# Patient Record
Sex: FEMALE | Race: OTHER | HISPANIC OR LATINO
[De-identification: names, ages, dates, MRNs, and addresses within clinical notes are randomized per-mention and may not be internally consistent; named-entity substitution may affect disease eponyms.]

---

## 2017-09-20 ENCOUNTER — APPOINTMENT (OUTPATIENT)
Dept: PULMONOLOGY | Facility: CLINIC | Age: 47
End: 2017-09-20
Payer: COMMERCIAL

## 2017-09-20 VITALS
WEIGHT: 153 LBS | BODY MASS INDEX: 27.11 KG/M2 | OXYGEN SATURATION: 98 % | SYSTOLIC BLOOD PRESSURE: 100 MMHG | HEART RATE: 75 BPM | RESPIRATION RATE: 15 BRPM | HEIGHT: 63 IN | DIASTOLIC BLOOD PRESSURE: 70 MMHG

## 2017-09-20 PROCEDURE — 94010 BREATHING CAPACITY TEST: CPT

## 2017-09-20 PROCEDURE — 99214 OFFICE O/P EST MOD 30 MIN: CPT | Mod: 25

## 2017-09-22 ENCOUNTER — APPOINTMENT (OUTPATIENT)
Dept: PULMONOLOGY | Facility: CLINIC | Age: 47
End: 2017-09-22

## 2018-04-11 ENCOUNTER — APPOINTMENT (OUTPATIENT)
Dept: PULMONOLOGY | Facility: CLINIC | Age: 48
End: 2018-04-11
Payer: COMMERCIAL

## 2018-04-11 VITALS
SYSTOLIC BLOOD PRESSURE: 110 MMHG | HEART RATE: 68 BPM | OXYGEN SATURATION: 100 % | WEIGHT: 141 LBS | BODY MASS INDEX: 24.98 KG/M2 | HEIGHT: 63 IN | RESPIRATION RATE: 17 BRPM | DIASTOLIC BLOOD PRESSURE: 70 MMHG

## 2018-04-11 PROCEDURE — 95012 NITRIC OXIDE EXP GAS DETER: CPT

## 2018-04-11 PROCEDURE — 94010 BREATHING CAPACITY TEST: CPT

## 2018-04-11 PROCEDURE — 99214 OFFICE O/P EST MOD 30 MIN: CPT | Mod: 25

## 2019-05-22 ENCOUNTER — NON-APPOINTMENT (OUTPATIENT)
Age: 49
End: 2019-05-22

## 2019-05-22 ENCOUNTER — APPOINTMENT (OUTPATIENT)
Dept: PULMONOLOGY | Facility: CLINIC | Age: 49
End: 2019-05-22
Payer: COMMERCIAL

## 2019-05-22 VITALS
HEIGHT: 62 IN | DIASTOLIC BLOOD PRESSURE: 78 MMHG | HEART RATE: 75 BPM | SYSTOLIC BLOOD PRESSURE: 125 MMHG | WEIGHT: 149 LBS | OXYGEN SATURATION: 99 % | BODY MASS INDEX: 27.42 KG/M2 | RESPIRATION RATE: 17 BRPM

## 2019-05-22 DIAGNOSIS — Z86.39 PERSONAL HISTORY OF OTHER ENDOCRINE, NUTRITIONAL AND METABOLIC DISEASE: ICD-10-CM

## 2019-05-22 DIAGNOSIS — Z86.79 PERSONAL HISTORY OF OTHER DISEASES OF THE CIRCULATORY SYSTEM: ICD-10-CM

## 2019-05-22 PROCEDURE — 95012 NITRIC OXIDE EXP GAS DETER: CPT

## 2019-05-22 PROCEDURE — 99214 OFFICE O/P EST MOD 30 MIN: CPT | Mod: 25

## 2019-05-22 PROCEDURE — 94010 BREATHING CAPACITY TEST: CPT

## 2019-05-22 RX ORDER — HYDROCHLOROTHIAZIDE 12.5 MG/1
12.5 TABLET ORAL
Refills: 0 | Status: ACTIVE | COMMUNITY

## 2019-05-22 NOTE — ADDENDUM
[FreeTextEntry1] : Documented by Marlo Spencer acting as a scribe for Dr. Gus Tovar on 05/22/2019.\par \par All medical record entries made by the Scribe were at my, Dr. Gus Tovar's, direction and personally dictated by me on 05/22/2019. I have reviewed the chart and agree that the record accurately reflects my personal performance of the history, physical exam, assessment and plan. I have also personally directed, reviewed, and agree with the discharge instructions.

## 2019-05-22 NOTE — REASON FOR VISIT
[Follow-Up] : a follow-up visit [FreeTextEntry1] : allergies, asthma, chronic cough, GERD, and poor sleep

## 2019-05-22 NOTE — PROCEDURE
[FreeTextEntry1] : PFT revealed normal flows, with a FEV1 of 2.46L, which is 92% of predicted, with a normal flow volume loop \par \par FENO was 8; a normal value being less than 25\par Fractional exhaled nitric oxide (FENO) is regarded as a simple, noninvasive method for assessing eosinophilic airway inflammation. Produced by a variety of cells within the lung, nitric oxide (NO) concentrations are generally low in healthy individuals. However, high concentrations of NO appear to be involved in nonspecific host defense mechanisms and chronic inflammatory diseases such as asthma. The American Thoracic Society (ATS) therefore has recommended using FENO to aid in the diagnosis and monitoring of eosinophilic airway inflammation and asthma, and for identifying steroid responsive individuals whose chronic respiratory symptoms may be caused by airway inflammation.\par

## 2019-05-22 NOTE — ASSESSMENT
[FreeTextEntry1] : Ms. Sams is a 49 year old female with a history of asthma, allergy, GERD, new HTN and Cholesterol - noncompliant.\par \par Her chronic cough is stable and multifactorial due to:\par -moderate persistent asthma\par -post nasal drip/allergies\par -reflux \par \par problem 1: moderate persistent asthma, (flare due to noncompliance)\par -Add Alvesco (80) 1 inhalation BID \par -Add ProAir 2 inhalations Q6H\par -continue to use Singulair 10 mg before bed\par \par -Asthma is  believed to be caused by inherited (genetic) and environmental factor, but its exact cause is unknown. Asthma may be triggered by allergens, lung infections, or irritants in the air. Asthma triggers are different for each person\par -Inhaler technique reviewed as well as oral hygiene techniques reviewed with patient. Avoidance of cold air, extremes of temperature, rescue inhaler should be used before exercise. Order of medication reviewed with patient. Recommended use of a cool mist humidifier in the bedroom. \par \par problem 2: allergies/post nasal drip syndrome\par -continue to use nasal saline \par -continue to use Nasonex 1 sniff each nostril BID\par -continue to use Astelin nasal spray 1 sniff each nostril BID\par -continue Claritin 10mg QHS\par -Environmental measures for allergies were encouraged including mattress and pillow cover, air purifier, and environmental controls. \par \par problem 3: GERD \par -can continue to use OTC Pepcid 40mg QHS\par -recommended to chew on DGL before meals as well\par -Rule of 2s: avoid eating too much, eating too late, eating too spicy, eating two hours before bed\par -Things to avoid including overeating, spicy foods, tight clothing, eating within three hours of bed, this list is not all inclusive. \par -For treatment of reflux, possible options discussed including diet control, H2 blockers, PPIs, as well as coating motility agents discussed as treatment options. Timing of meals and proximity of last meal to sleep were discussed. If symptoms persist, a formal gastrointestinal evaluation is needed.  \par \par problem 4: questionable sleep study - no snoring\par -based on her fatigue, poor sleep, silent reflux, questionable snoring, and elevated mallampati class\par -she is being set up for an at home sleep study\par -Discussed the risks/associations with coronary artery disease, atrial fibrillation, arrhythmia, memory loss, issues with concentration, hypertension, nocturia, chronic reflux/Calvin’s esophagus some but not all inclusive. Treatment options discussed including CPAP/BiPAP machine, oral appliance, ProVent therapy, Oxy-Aid by [employer], new technologies, or positional sleep.\par \par problem 5: overweight, improved \par Weight loss, exercise, and diet control were discussed and are highly encouraged. Treatment options were given such as, aqua therapy, and contacting a nutritionist. Recommended to use the elliptical, stationary bike, less use of treadmill. Mindful eating was explained to the patient Obesity is associated with worsening asthma, shortness of breath, and potential for cardiac disease, diabetes, and other underlying medical conditions. \par \par problem 6: health maintenance \par -recommended yearly flu shot after October 15\par -recommended strep pneumonia vaccines: Prevnar-13 vaccine, followed by Pneumo vaccine 23 one year following\par -recommended early intervention for URIs\par -recommended regular osteoporosis evaluations\par -recommended early dermatological evaluations\par -recommended after the age of 50 to the age of 70, colonoscopy every 5 years \par \par F/U in 6 months\par She is encouraged to call with any changes, concerns, or questions

## 2019-05-22 NOTE — HISTORY OF PRESENT ILLNESS
[FreeTextEntry1] : Ms. Sams is a 48 year old female presenting to the office today for a follow up visit for allergies, asthma, chronic cough, GERD, and poor sleep. Her chief complaint is \par -she reports having newly diagnosed elevated cholesterol and HTN\par -she notes she exercises regularly by walking\par -she notes her eyes are dry with her contacts\par -she reports her reflux is controlled with medication, with rare episodes occurring\par -she notes her vision quality has slightly decreased, with possible beginnings of cataracts\par -she reports constantly coughing, with a weak voice\par -she states she is constantly tired\par -she denies any snoring, chest pain, chest pressure, diarrhea, constipation, dysphagia, dizziness, leg swelling, leg pain, itchy eyes, itchy ears, heartburn, sour taste in the mouth, myalgias or arthralgias.

## 2019-05-28 ENCOUNTER — MEDICATION RENEWAL (OUTPATIENT)
Age: 49
End: 2019-05-28

## 2019-05-28 RX ORDER — AZELASTINE HYDROCHLORIDE 137 UG/1
0.1 SPRAY, METERED NASAL TWICE DAILY
Qty: 3 | Refills: 1 | Status: ACTIVE | COMMUNITY
Start: 2017-09-20 | End: 1900-01-01

## 2020-06-07 ENCOUNTER — RX RENEWAL (OUTPATIENT)
Age: 50
End: 2020-06-07

## 2020-07-15 ENCOUNTER — APPOINTMENT (OUTPATIENT)
Dept: PULMONOLOGY | Facility: CLINIC | Age: 50
End: 2020-07-15
Payer: COMMERCIAL

## 2020-07-15 VITALS
TEMPERATURE: 97.6 F | DIASTOLIC BLOOD PRESSURE: 78 MMHG | HEART RATE: 76 BPM | HEIGHT: 62 IN | OXYGEN SATURATION: 98 % | WEIGHT: 154 LBS | RESPIRATION RATE: 17 BRPM | SYSTOLIC BLOOD PRESSURE: 130 MMHG | BODY MASS INDEX: 28.34 KG/M2

## 2020-07-15 DIAGNOSIS — Z01.812 ENCOUNTER FOR PREPROCEDURAL LABORATORY EXAMINATION: ICD-10-CM

## 2020-07-15 PROCEDURE — 71046 X-RAY EXAM CHEST 2 VIEWS: CPT

## 2020-07-15 PROCEDURE — 99214 OFFICE O/P EST MOD 30 MIN: CPT | Mod: 25

## 2020-07-15 NOTE — ASSESSMENT
[FreeTextEntry1] : Ms. Sams is a 50 year old female with a history of asthma, allergy, GERD, Eosinophilia, Esophagitis, new HTN and Cholesterol - noncompliant - intermittent cough. \par \par Her chronic cough is stable and multifactorial due to:\par -moderate persistent asthma\par -post nasal drip/allergies\par -reflux \par \par problem 1: moderate persistent asthma, (flare due to noncompliance)\par -Add Symbicort (160) 2 inhalations BID PRN \par -continue ProAir 2 inhalations Q6H\par -continue to use Singulair 10 mg before bed\par \par -Asthma is  believed to be caused by inherited (genetic) and environmental factor, but its exact cause is unknown. Asthma may be triggered by allergens, lung infections, or irritants in the air. Asthma triggers are different for each person\par -Inhaler technique reviewed as well as oral hygiene techniques reviewed with patient. Avoidance of cold air, extremes of temperature, rescue inhaler should be used before exercise. Order of medication reviewed with patient. Recommended use of a cool mist humidifier in the bedroom. \par \par problem 2: allergies/post nasal drip syndrome\par -continue to use nasal saline \par -continue to use Nasonex 1 sniff each nostril BID\par -continue to use Astelin nasal spray 1 sniff each nostril BID\par -continue Claritin 10mg QHS\par -Environmental measures for allergies were encouraged including mattress and pillow cover, air purifier, and environmental controls. \par \par problem 3: GERD - noncompliance \par -continue to use OTC Pepcid 40mg QHS (regular use) \par -recommended to chew on DGL before meals as well\par -Rule of 2s: avoid eating too much, eating too late, eating too spicy, eating two hours before bed\par -Things to avoid including overeating, spicy foods, tight clothing, eating within three hours of bed, this list is not all inclusive. \par -For treatment of reflux, possible options discussed including diet control, H2 blockers, PPIs, as well as coating motility agents discussed as treatment options. Timing of meals and proximity of last meal to sleep were discussed. If symptoms persist, a formal gastrointestinal evaluation is needed.  \par \par problem 4: questionable sleep study - no snoring\par -based on her fatigue, poor sleep, silent reflux, questionable snoring, and elevated mallampati class\par -she is being set up for an at home sleep study\par -Discussed the risks/associations with coronary artery disease, atrial fibrillation, arrhythmia, memory loss, issues with concentration, hypertension, nocturia, chronic reflux/Calvin’s esophagus some but not all inclusive. Treatment options discussed including CPAP/BiPAP machine, oral appliance, ProVent therapy, Oxy-Aid by [employer], new technologies, or positional sleep.\par \par problem 5: overweight, improved \par Weight loss, exercise, and diet control were discussed and are highly encouraged. Treatment options were given such as, aqua therapy, and contacting a nutritionist. Recommended to use the elliptical, stationary bike, less use of treadmill. Mindful eating was explained to the patient Obesity is associated with worsening asthma, shortness of breath, and potential for cardiac disease, diabetes, and other underlying medical conditions. \par \par problem 6: health maintenance \par -recommended yearly flu shot after October 15\par -recommended strep pneumonia vaccines: Prevnar-13 vaccine, followed by Pneumo vaccine 23 one year following\par -recommended early intervention for URIs\par -recommended regular osteoporosis evaluations\par -recommended early dermatological evaluations\par -recommended after the age of 50 to the age of 70, colonoscopy every 5 years \par \par F/U in 6 months\par She is encouraged to call with any changes, concerns, or questions

## 2020-07-15 NOTE — PHYSICAL EXAM
[General Appearance - Well Developed] : well developed [General Appearance - Well Nourished] : well nourished [Well Groomed] : well groomed [Normal Appearance] : normal appearance [General Appearance - In No Acute Distress] : no acute distress [No Deformities] : no deformities [Eyelids - No Xanthelasma] : the eyelids demonstrated no xanthelasmas [Normal Conjunctiva] : the conjunctiva exhibited no abnormalities [Normal Oropharynx] : normal oropharynx [II] : II [Jugular Venous Distention Increased] : there was no jugular-venous distention [Neck Appearance] : the appearance of the neck was normal [Neck Cervical Mass (___cm)] : no neck mass was observed [Thyroid Diffuse Enlargement] : the thyroid was not enlarged [Thyroid Nodule] : there were no palpable thyroid nodules [Heart Rate And Rhythm] : heart rate and rhythm were normal [Heart Sounds] : normal S1 and S2 [Murmurs] : no murmurs present [Respiration, Rhythm And Depth] : normal respiratory rhythm and effort [Exaggerated Use Of Accessory Muscles For Inspiration] : no accessory muscle use [Auscultation Breath Sounds / Voice Sounds] : lungs were clear to auscultation bilaterally [Abdomen Tenderness] : non-tender [Abdomen Soft] : soft [Abdomen Mass (___ Cm)] : no abdominal mass palpated [Gait - Sufficient For Exercise Testing] : the gait was sufficient for exercise testing [Abnormal Walk] : normal gait [Nail Clubbing] : no clubbing of the fingernails [Cyanosis, Localized] : no localized cyanosis [Petechial Hemorrhages (___cm)] : no petechial hemorrhages [Skin Color & Pigmentation] : normal skin color and pigmentation [] : no rash [No Venous Stasis] : no venous stasis [Skin Lesions] : no skin lesions [No Skin Ulcers] : no skin ulcer [No Xanthoma] : no  xanthoma was observed [No Focal Deficits] : no focal deficits [Deep Tendon Reflexes (DTR)] : deep tendon reflexes were 2+ and symmetric [Sensation] : the sensory exam was normal to light touch and pinprick [Oriented To Time, Place, And Person] : oriented to person, place, and time [Affect] : the affect was normal [Impaired Insight] : insight and judgment were intact [FreeTextEntry1] : I:E 1:3; clear

## 2020-07-15 NOTE — ADDENDUM
[FreeTextEntry1] : Documented by Paramjit Wolff acting as a scribe for Dr. Gus Tovar on 07/15/2020 \par \par All medical record entries made by the Scribe were at my, Dr. Gus Tovar's, direction and personally dictated by me on 07/15/2020 . I have reviewed the chart and agree that the record accurately reflects my personal performance of the history, physical exam, assessment and plan. I have also personally directed, reviewed, and agree with the discharge instructions.

## 2020-07-15 NOTE — HISTORY OF PRESENT ILLNESS
[FreeTextEntry1] : Ms. Sams is a 48 year old female presenting to the office today for a follow up visit for allergies, asthma, chronic cough, GERD, and poor sleep. Her chief complaint is SOB. \par -she states that she went through 3000 dollars worth of allergies test and she was only allergic to one thing. \par -she states that her nose is always running. \par -she states that she feels fine but then she suddenly feels tightness. \par -reports dysphagia. \par -if she takes Pepcid at night her GERD and dysphagia better. \par -she states that she might be having panic attacks due to anxiety related with COVID-19. \par \par -She denies any headaches, nausea, vomiting, fever, chills, sweats, chest pain, chest pressure, diarrhea, constipation, dizziness, sour taste in the mouth, leg swelling, leg pain, itchy eyes, itchy ears, heartburn, reflux, myalgias or arthralgias.

## 2020-07-16 ENCOUNTER — TRANSCRIPTION ENCOUNTER (OUTPATIENT)
Age: 50
End: 2020-07-16

## 2020-07-16 RX ORDER — ROSUVASTATIN CALCIUM 5 MG/1
5 TABLET, FILM COATED ORAL
Refills: 0 | Status: DISCONTINUED | COMMUNITY
End: 2020-07-16

## 2020-07-16 RX ORDER — ATORVASTATIN CALCIUM 80 MG/1
TABLET, FILM COATED ORAL
Refills: 0 | Status: ACTIVE | COMMUNITY

## 2020-07-21 DIAGNOSIS — Z01.818 ENCOUNTER FOR OTHER PREPROCEDURAL EXAMINATION: ICD-10-CM

## 2020-07-24 ENCOUNTER — APPOINTMENT (OUTPATIENT)
Dept: DISASTER EMERGENCY | Facility: CLINIC | Age: 50
End: 2020-07-24

## 2020-07-24 LAB — SARS-COV-2 N GENE NPH QL NAA+PROBE: NOT DETECTED

## 2020-07-29 ENCOUNTER — APPOINTMENT (OUTPATIENT)
Dept: PULMONOLOGY | Facility: CLINIC | Age: 50
End: 2020-07-29
Payer: COMMERCIAL

## 2020-07-29 PROCEDURE — 94727 GAS DIL/WSHOT DETER LNG VOL: CPT

## 2020-07-29 PROCEDURE — 94729 DIFFUSING CAPACITY: CPT

## 2020-07-29 PROCEDURE — 94010 BREATHING CAPACITY TEST: CPT

## 2021-07-20 ENCOUNTER — APPOINTMENT (OUTPATIENT)
Dept: PULMONOLOGY | Facility: CLINIC | Age: 51
End: 2021-07-20
Payer: COMMERCIAL

## 2021-07-20 ENCOUNTER — NON-APPOINTMENT (OUTPATIENT)
Age: 51
End: 2021-07-20

## 2021-07-20 VITALS
SYSTOLIC BLOOD PRESSURE: 115 MMHG | BODY MASS INDEX: 27.79 KG/M2 | WEIGHT: 151 LBS | OXYGEN SATURATION: 99 % | DIASTOLIC BLOOD PRESSURE: 65 MMHG | HEART RATE: 78 BPM | RESPIRATION RATE: 16 BRPM | TEMPERATURE: 97.4 F | HEIGHT: 62 IN

## 2021-07-20 PROCEDURE — 95012 NITRIC OXIDE EXP GAS DETER: CPT

## 2021-07-20 PROCEDURE — 99214 OFFICE O/P EST MOD 30 MIN: CPT | Mod: 25

## 2021-07-20 PROCEDURE — 99072 ADDL SUPL MATRL&STAF TM PHE: CPT

## 2021-07-20 PROCEDURE — 94010 BREATHING CAPACITY TEST: CPT

## 2021-07-20 RX ORDER — CICLESONIDE 80 UG/1
80 AEROSOL, METERED RESPIRATORY (INHALATION) TWICE DAILY
Qty: 3 | Refills: 1 | Status: DISCONTINUED | COMMUNITY
Start: 2019-05-22 | End: 2021-07-20

## 2021-07-20 NOTE — PROCEDURE
[FreeTextEntry1] : \par PFT revealed normal flows, with a FEV1 of 2.43 L, which is 92% of predicted, normal lung volumes, and with a normal flow volume loop. \par \par FENO was 13; a normal value being less than 25\par Fractional exhaled nitric oxide (FENO) is regarded as a simple, noninvasive method for assessing eosinophilic airway inflammation. Produced by a variety of cells within the lung, nitric oxide (NO) concentrations are generally low in healthy individuals. However, high concentrations of NO appear to be involved in nonspecific host defense mechanisms and chronic inflammatory diseases such as asthma. The American Thoracic Society (ATS) therefore has recommended using FENO to aid in the diagnosis and monitoring of eosinophilic airway inflammation and asthma, and for identifying steroid responsive individuals whose chronic respiratory symptoms may be caused by airway inflammation.

## 2021-07-20 NOTE — HISTORY OF PRESENT ILLNESS
[FreeTextEntry1] : Ms. Sams is a 51 year old female presenting to the office today for a follow up visit for allergies, asthma, chronic cough, GERD, and poor sleep. Her chief complaint is intermittent cough\par \par -she notes generally feeling well\par -she notes compliant with allergy Rx\par -she notes supplementing with Arm and Hammer Saline\par -she notes mild intermittent cough with residual mild wheeze\par -she notes cough exacerbated while supine, worse in fall season and late spring\par -she notes heartburn and reflux controlled with Pepcid compliance\par -she denies palpitations\par -she notes dysphagia controlled\par -she notes sleep quality stable\par -she notes sleeping 8 hours a night on average\par -she denies snore\par -she notes regular exercise at least 3 days a week on average\par -she notes weight stable\par -she notes fitness levels stable\par -she notes intermittent dysphonia exacerbated by reflux \par -she notes intermittently drippy sinuses controlled with nasal saline\par -she notes intermittent irregular menstrual cycle\par \par -denies any chest pain, chest pressure, diarrhea, constipation, sour taste in the mouth, dizziness, leg swelling, leg pain, myalgias, arthralgias, itchy eyes, itchy ears.

## 2021-07-20 NOTE — PHYSICAL EXAM
[General Appearance - Well Developed] : well developed [Normal Appearance] : normal appearance [Well Groomed] : well groomed [General Appearance - Well Nourished] : well nourished [No Deformities] : no deformities [General Appearance - In No Acute Distress] : no acute distress [Normal Conjunctiva] : the conjunctiva exhibited no abnormalities [Eyelids - No Xanthelasma] : the eyelids demonstrated no xanthelasmas [Normal Oropharynx] : normal oropharynx [II] : II [Neck Appearance] : the appearance of the neck was normal [Neck Cervical Mass (___cm)] : no neck mass was observed [Jugular Venous Distention Increased] : there was no jugular-venous distention [Thyroid Nodule] : there were no palpable thyroid nodules [Thyroid Diffuse Enlargement] : the thyroid was not enlarged [Heart Rate And Rhythm] : heart rate and rhythm were normal [Heart Sounds] : normal S1 and S2 [Murmurs] : no murmurs present [Respiration, Rhythm And Depth] : normal respiratory rhythm and effort [Exaggerated Use Of Accessory Muscles For Inspiration] : no accessory muscle use [Auscultation Breath Sounds / Voice Sounds] : lungs were clear to auscultation bilaterally [Abdomen Soft] : soft [Abdomen Tenderness] : non-tender [Abdomen Mass (___ Cm)] : no abdominal mass palpated [Abnormal Walk] : normal gait [Gait - Sufficient For Exercise Testing] : the gait was sufficient for exercise testing [Nail Clubbing] : no clubbing of the fingernails [Cyanosis, Localized] : no localized cyanosis [Petechial Hemorrhages (___cm)] : no petechial hemorrhages [Skin Color & Pigmentation] : normal skin color and pigmentation [] : no rash [No Venous Stasis] : no venous stasis [Skin Lesions] : no skin lesions [No Skin Ulcers] : no skin ulcer [No Xanthoma] : no  xanthoma was observed [Deep Tendon Reflexes (DTR)] : deep tendon reflexes were 2+ and symmetric [Sensation] : the sensory exam was normal to light touch and pinprick [No Focal Deficits] : no focal deficits [Oriented To Time, Place, And Person] : oriented to person, place, and time [Impaired Insight] : insight and judgment were intact [Affect] : the affect was normal [FreeTextEntry1] : I:E 1:3; clear

## 2021-07-20 NOTE — ASSESSMENT
[FreeTextEntry1] : Ms. Sams is a 51 year old female with a history of asthma, allergy, GERD, Eosinophilia, Esophagitis, new HTN and Cholesterol - noncompliant - intermittent cough; PND\par \par Her chronic cough is stable and multifactorial due to:\par -moderate persistent asthma\par -post nasal drip/allergies\par -reflux \par \par problem 1: moderate persistent asthma, (semicompliance)\par -continue Symbicort (160) 2 inhalations BID PRN \par -continue ProAir 2 inhalations Q6H\par -continue to use Singulair 10 mg before bed\par \par -Asthma is  believed to be caused by inherited (genetic) and environmental factor, but its exact cause is unknown. Asthma may be triggered by allergens, lung infections, or irritants in the air. Asthma triggers are different for each person\par -Inhaler technique reviewed as well as oral hygiene techniques reviewed with patient. Avoidance of cold air, extremes of temperature, rescue inhaler should be used before exercise. Order of medication reviewed with patient. Recommended use of a cool mist humidifier in the bedroom. \par \par problem 2: allergies/post nasal drip syndrome\par -continue to use nasal saline \par -continue to use Nasonex 1 sniff each nostril BID\par -continue to use Astelin (0.15) nasal spray 1 sniff each nostril BID\par -continue Claritin 10mg QHS\par -Environmental measures for allergies were encouraged including mattress and pillow cover, air purifier, and environmental controls. \par \par problem 3: GERD - noncompliance \par -continue to use OTC Pepcid 40mg QHS (regular use) \par -recommended to chew on DGL before meals as well\par -Rule of 2s: avoid eating too much, eating too late, eating too spicy, eating two hours before bed\par -Things to avoid including overeating, spicy foods, tight clothing, eating within three hours of bed, this list is not all inclusive. \par -For treatment of reflux, possible options discussed including diet control, H2 blockers, PPIs, as well as coating motility agents discussed as treatment options. Timing of meals and proximity of last meal to sleep were discussed. If symptoms persist, a formal gastrointestinal evaluation is needed.  \par \par problem 4: questionable sleep study - no snoring\par -based on her fatigue, poor sleep, silent reflux, questionable snoring, and elevated Mallampati class\par -she is being set up for an at home sleep study\par -Discussed the risks/associations with coronary artery disease, atrial fibrillation, arrhythmia, memory loss, issues with concentration, hypertension, nocturia, chronic reflux/Calvin’s esophagus some but not all inclusive. Treatment options discussed including CPAP/BiPAP machine, oral appliance, ProVent therapy, Oxy-Aid by [employer], new technologies, or positional sleep.\par \par problem 5: overweight, improved \par Weight loss, exercise, and diet control were discussed and are highly encouraged. Treatment options were given such as, aqua therapy, and contacting a nutritionist. Recommended to use the elliptical, stationary bike, less use of treadmill. Mindful eating was explained to the patient Obesity is associated with worsening asthma, shortness of breath, and potential for cardiac disease, diabetes, and other underlying medical conditions. \par \par problem 6: health maintenance \par -s/p Moderna COVID 19 vaccine x 2\par -recommended yearly flu shot after October 15\par -recommended strep pneumonia vaccines: Prevnar-13 vaccine, followed by Pneumo vaccine 23 one year following\par -recommended early intervention for URIs\par -recommended regular osteoporosis evaluations\par -recommended early dermatological evaluations\par -recommended after the age of 50 to the age of 70, colonoscopy every 5 years \par \par F/U in 6 months\par She is encouraged to call with any changes, concerns, or questions

## 2021-07-20 NOTE — ADDENDUM
[FreeTextEntry1] : Documented by Guille Lopez acting as a scribe for Dr. Gus Tovar on 07/20/2021.\par \par All medical record entries made by the Scribe were at my, Dr. Gus Tovar's, direction and personally dictated by me on 07/20/2021 . I have reviewed the chart and agree that the record accurately reflects my personal performance of the history, physical exam, assessment and plan. I have also personally directed, reviewed, and agree with the discharge instructions. \par

## 2021-08-02 ENCOUNTER — TRANSCRIPTION ENCOUNTER (OUTPATIENT)
Age: 51
End: 2021-08-02

## 2021-08-03 ENCOUNTER — TRANSCRIPTION ENCOUNTER (OUTPATIENT)
Age: 51
End: 2021-08-03

## 2021-08-23 ENCOUNTER — TRANSCRIPTION ENCOUNTER (OUTPATIENT)
Age: 51
End: 2021-08-23

## 2022-01-11 ENCOUNTER — MED ADMIN CHARGE (OUTPATIENT)
Age: 52
End: 2022-01-11

## 2022-01-12 ENCOUNTER — APPOINTMENT (OUTPATIENT)
Dept: PULMONOLOGY | Facility: CLINIC | Age: 52
End: 2022-01-12
Payer: COMMERCIAL

## 2022-01-12 ENCOUNTER — NON-APPOINTMENT (OUTPATIENT)
Age: 52
End: 2022-01-12

## 2022-01-12 VITALS
HEIGHT: 62 IN | OXYGEN SATURATION: 98 % | BODY MASS INDEX: 28.16 KG/M2 | DIASTOLIC BLOOD PRESSURE: 72 MMHG | HEART RATE: 74 BPM | TEMPERATURE: 97.3 F | RESPIRATION RATE: 16 BRPM | WEIGHT: 153 LBS | SYSTOLIC BLOOD PRESSURE: 120 MMHG

## 2022-01-12 PROCEDURE — 94010 BREATHING CAPACITY TEST: CPT

## 2022-01-12 PROCEDURE — 99214 OFFICE O/P EST MOD 30 MIN: CPT | Mod: 25

## 2022-01-12 PROCEDURE — 95012 NITRIC OXIDE EXP GAS DETER: CPT

## 2022-01-12 RX ORDER — BUDESONIDE AND FORMOTEROL FUMARATE DIHYDRATE 160; 4.5 UG/1; UG/1
160-4.5 AEROSOL RESPIRATORY (INHALATION) TWICE DAILY
Qty: 3 | Refills: 1 | Status: ACTIVE | COMMUNITY

## 2022-01-12 RX ORDER — MONTELUKAST 10 MG/1
10 TABLET, FILM COATED ORAL
Qty: 90 | Refills: 1 | Status: ACTIVE | COMMUNITY
Start: 2019-05-22 | End: 1900-01-01

## 2022-01-12 RX ORDER — ALBUTEROL SULFATE 90 UG/1
108 (90 BASE) INHALANT RESPIRATORY (INHALATION)
Qty: 1 | Refills: 5 | Status: ACTIVE | COMMUNITY
Start: 2019-05-28 | End: 1900-01-01

## 2022-01-12 NOTE — HISTORY OF PRESENT ILLNESS
[FreeTextEntry1] : Ms. Sams is a 51 year old female presenting to the office today for a follow up visit for allergies, asthma, chronic cough, GERD, and poor sleep. Her chief complaint is \par -she is doing well in general \par -she notes heartburn and reflux controlled \par -she is walking 3x per week for exercise \par -she is sleeping 8-9 hours per night \par -she is using nasal saline regularly \par -She is using Symbicort at night \par \par - patient denies any headaches, nausea, vomiting, fever, chills, sweats, chest pain, chest pressure, palpitations, coughing, wheezing, fatigue, diarrhea, constipation, dysphagia, myalgias, dizziness, leg swelling, leg pain, itchy eyes, itchy ears, or sour taste in the mouth

## 2022-01-12 NOTE — PHYSICAL EXAM
[No Acute Distress] : no acute distress [Normal Oropharynx] : normal oropharynx [Normal Appearance] : normal appearance [No Neck Mass] : no neck mass [Normal Rate/Rhythm] : normal rate/rhythm [Normal S1, S2] : normal s1, s2 [No Murmurs] : no murmurs [No Resp Distress] : no resp distress [Clear to Auscultation Bilaterally] : clear to auscultation bilaterally [No Abnormalities] : no abnormalities [Benign] : benign [Normal Gait] : normal gait [No Clubbing] : no clubbing [No Cyanosis] : no cyanosis [No Edema] : no edema [FROM] : FROM [Normal Color/ Pigmentation] : normal color/ pigmentation [No Focal Deficits] : no focal deficits [Oriented x3] : oriented x3 [Normal Affect] : normal affect [III] : Mallampati Class: III [TextBox_2] : OW  [TextBox_68] : I:E 1:3; Clear

## 2022-01-12 NOTE — PROCEDURE
[FreeTextEntry1] : Feno was 10; a normal value being less than 25. Fractional exhaled nitric oxide (FENO) is regarded as a simple, noninvasive method for assessing eosinophilic airway inflammation. Produced by a variety of cells within the lung, nitric oxide (NO) concentrations are generally low in healthy individuals. However, high concentrations of NO appear to be involved in nonspecific host defense mechanisms and chronic inflammatory  diseases such as asthma. The American Thoracic Society (ATS) therefore recommended using FENO to aid in the diagnosis and monitoring of eosinophilic airway inflammation and asthma, and for identifying steroid responsive individuals whose chronic respiratory symptoms may be caused by airway inflammation \par \par PFT revealed normal flows, with a FEV1 of 2.48L, which is 94% of predicted, with a normal flow volume loop\par  \par \par -Images and procedures reviewed in detail and discussed with patient.

## 2022-01-12 NOTE — ADDENDUM
[FreeTextEntry1] : Documented by Wayne Dutta acting as a scribe for Dr. Gus Tovar on (01/12/2022).\par \par All medical record entries made by the Scribe were at my, Dr. Gus Tovar's, direction and personally dictated by me on (01/12/2022). I have reviewed the chart and agree that the record accurately reflects my personal performance of the history, physical exam, assessment and plan. I have also personally directed, reviewed, and agree with the discharge instructions.\par

## 2022-01-12 NOTE — ASSESSMENT
[FreeTextEntry1] : Ms. Sams is a 51 year old female with a history of asthma, allergy, GERD, OW, Eosinophilic Esophagitis, HTN and Cholesterol - noncompliant - intermittent asthma sx\par \par Her chronic cough is stable and multifactorial due to:\par -moderate persistent asthma\par -post nasal drip/allergies\par -reflux \par \par problem 1: moderate persistent asthma, (semicompliance)\par -continue Symbicort (160) 2 inhalations BID PRN \par -continue ProAir 2 inhalations Q6H\par -continue to use Singulair 10 mg before bed\par \par -Asthma is  believed to be caused by inherited (genetic) and environmental factor, but its exact cause is unknown. Asthma may be triggered by allergens, lung infections, or irritants in the air. Asthma triggers are different for each person\par -Inhaler technique reviewed as well as oral hygiene techniques reviewed with patient. Avoidance of cold air, extremes of temperature, rescue inhaler should be used before exercise. Order of medication reviewed with patient. Recommended use of a cool mist humidifier in the bedroom. \par \par problem 2: allergies/post nasal drip syndrome\par -continue to use nasal saline washer \par -continue to use Nasonex 1 sniff each nostril BID\par -continue to use Astelin (0.15) nasal spray 1 sniff each nostril BID\par -continue Claritin 10mg QHS\par -Environmental measures for allergies were encouraged including mattress and pillow cover, air purifier, and environmental controls. \par \par problem 3: GERD - noncompliance \par -continue to use OTC Pepcid 40mg QHS (regular use) \par -recommended to chew on DGL before meals as well\par -Rule of 2s: avoid eating too much, eating too late, eating too spicy, eating two hours before bed\par -Things to avoid including overeating, spicy foods, tight clothing, eating within three hours of bed, this list is not all inclusive. \par -For treatment of reflux, possible options discussed including diet control, H2 blockers, PPIs, as well as coating motility agents discussed as treatment options. Timing of meals and proximity of last meal to sleep were discussed. If symptoms persist, a formal gastrointestinal evaluation is needed.  \par \par problem 4: questionable sleep study - no snoring\par -based on her fatigue, poor sleep, silent reflux, questionable snoring, and elevated Mallampati class\par -she is being set up for an at home sleep study\par -Discussed the risks/associations with coronary artery disease, atrial fibrillation, arrhythmia, memory loss, issues with concentration, hypertension, nocturia, chronic reflux/Calvin’s esophagus some but not all inclusive. Treatment options discussed including CPAP/BiPAP machine, oral appliance, ProVent therapy, Oxy-Aid by [employer], new technologies, or positional sleep.\par \par problem 5: overweight, improved \par -Recommend "Muniq" OTC Supplement for weight loss, energy levels, and blood sugar levels \par Weight loss, exercise, and diet control were discussed and are highly encouraged. Treatment options were given such as, aqua therapy, and contacting a nutritionist. Recommended to use the elliptical, stationary bike, less use of treadmill. Mindful eating was explained to the patient Obesity is associated with worsening asthma, shortness of breath, and potential for cardiac disease, diabetes, and other underlying medical conditions. \par \par problem 6: health maintenance \par -s/p Moderna COVID 19 vaccine x 2 (booster on hold) \par -recommended yearly flu shot after October 15 2021 \par -recommended strep pneumonia vaccines: Prevnar-13 vaccine, followed by Pneumo vaccine 23 one year following\par -recommended early intervention for URIs\par -recommended regular osteoporosis evaluations\par -recommended early dermatological evaluations\par -recommended after the age of 50 to the age of 70, colonoscopy every 5 years \par \par F/U in 6 months\par She is encouraged to call with any changes, concerns, or questions

## 2022-01-13 ENCOUNTER — TRANSCRIPTION ENCOUNTER (OUTPATIENT)
Age: 52
End: 2022-01-13

## 2022-01-17 ENCOUNTER — RX RENEWAL (OUTPATIENT)
Age: 52
End: 2022-01-17

## 2022-01-17 ENCOUNTER — TRANSCRIPTION ENCOUNTER (OUTPATIENT)
Age: 52
End: 2022-01-17

## 2022-03-29 ENCOUNTER — TRANSCRIPTION ENCOUNTER (OUTPATIENT)
Age: 52
End: 2022-03-29

## 2022-03-29 ENCOUNTER — NON-APPOINTMENT (OUTPATIENT)
Age: 52
End: 2022-03-29

## 2022-04-14 ENCOUNTER — APPOINTMENT (OUTPATIENT)
Dept: PULMONOLOGY | Facility: CLINIC | Age: 52
End: 2022-04-14
Payer: COMMERCIAL

## 2022-04-14 VITALS
TEMPERATURE: 97.2 F | HEART RATE: 74 BPM | OXYGEN SATURATION: 98 % | DIASTOLIC BLOOD PRESSURE: 84 MMHG | BODY MASS INDEX: 27.97 KG/M2 | RESPIRATION RATE: 16 BRPM | SYSTOLIC BLOOD PRESSURE: 136 MMHG | HEIGHT: 62 IN | WEIGHT: 152 LBS

## 2022-04-14 PROCEDURE — 94010 BREATHING CAPACITY TEST: CPT

## 2022-04-14 PROCEDURE — 95012 NITRIC OXIDE EXP GAS DETER: CPT

## 2022-04-14 PROCEDURE — 99214 OFFICE O/P EST MOD 30 MIN: CPT | Mod: 25

## 2022-04-14 PROCEDURE — 94729 DIFFUSING CAPACITY: CPT

## 2022-04-14 PROCEDURE — 94727 GAS DIL/WSHOT DETER LNG VOL: CPT

## 2022-04-14 RX ORDER — OLOPATADINE HYDROCHLORIDE 2 MG/ML
0.2 SOLUTION OPHTHALMIC
Qty: 1 | Refills: 3 | Status: ACTIVE | COMMUNITY
Start: 2022-04-14 | End: 1900-01-01

## 2022-04-14 NOTE — PROCEDURE
[FreeTextEntry1] : Full PFT revealed normal flows, with a FEV1 of 2.53L,which is 100% of predicted but mild obstructive dysfunction at mid to low lung volumes,  normal lung volumes, and a diffusion of 21.8 , which is 102% of predicted with a normal flow volume loop\par \par FENO was 9 ; a normal value being less than 25\par Fractional exhaled nitric oxide (FENO) is regarded as a simple, noninvasive method for assessing eosinophilic airway inflammation. Produced by a variety of cells within the lung, nitric oxide (NO) concentrations are generally low in healthy individuals. However, high concentrations of NO appear to be involved in nonspecific host defense mechanisms and chronic inflammatory diseases such as asthma. The American Thoracic Society (ATS) therefore has recommended using FENO to aid in the diagnosis and monitoring of eosinophilic airway inflammation and asthma, and for identifying steroid responsive individuals whose chronic respiratory symptoms may be caused by airway inflammation. \par \par Blood work (2/2022) AARON positive, anti osorio rnp positive, low normal Vitamin D of 32, wbc 4.5 hgb 15.3 hct 43.7 plt 249 eos 140, CRP 1  \par

## 2022-04-14 NOTE — ADDENDUM
[FreeTextEntry1] : Documented by Hector Hollins acting as a scribe for Dr. Gus Tovar on 04/14/2022 \par \par All medical record entries made by the Scribe were at my, Dr. Gus Tovar's, direction and personally dictated by me on 04/14/2022 . I have reviewed the chart and agree that the record accurately reflects my personal performance of the history, physical exam, assessment and plan. I have also personally directed, reviewed, and agree with the discharge instructions

## 2022-04-14 NOTE — ASSESSMENT
[FreeTextEntry1] : Ms. Sams is a 52 year old female with a history of asthma, allergy, GERD, OW, Eosinophilic Esophagitis, HTN and Cholesterol - noncompliant - intermittent asthma sx, #1 issue scicca syndrome \par \par Her chronic cough is stable and multifactorial due to:\par -moderate persistent asthma\par -post nasal drip/allergies\par -reflux \par \par Problem 1A sicca Sx\par -recommended rheumatologist evaluation \par -recommended ENT evaluation ([facility])\par -recommended mouth kote tea \par -recommended oral pathologist ([facility]/ Hern[facility]) \par \par problem 1: moderate persistent asthma, (semi compliance)\par -continue Symbicort (160) 2 inhalations BID PRN \par -continue ProAir 2 inhalations Q6H\par -continue to use Singulair 10 mg before bed\par \par -Asthma is  believed to be caused by inherited (genetic) and environmental factor, but its exact cause is unknown. Asthma may be triggered by allergens, lung infections, or irritants in the air. Asthma triggers are different for each person\par -Inhaler technique reviewed as well as oral hygiene techniques reviewed with patient. Avoidance of cold air, extremes of temperature, rescue inhaler should be used before exercise. Order of medication reviewed with patient. Recommended use of a cool mist humidifier in the bedroom. \par \par problem 2: allergies/post nasal drip syndrome\par  -Add Olopatadine 0.2% eye drops, one drop in each eye BID \par -continue to use nasal saline washer \par -continue to use Nasonex 1 sniff each nostril BID\par -continue to use Astelin (0.15) nasal spray 1 sniff each nostril BID\par -continue Claritin 10mg QHS\par -Environmental measures for allergies were encouraged including mattress and pillow cover, air purifier, and environmental controls. \par \par problem 3: GERD - noncompliance \par -continue to use OTC Pepcid 40mg QHS (regular use) \par -recommended to chew on DGL before meals as well\par -Rule of 2s: avoid eating too much, eating too late, eating too spicy, eating two hours before bed\par -Things to avoid including overeating, spicy foods, tight clothing, eating within three hours of bed, this list is not all inclusive. \par -For treatment of reflux, possible options discussed including diet control, H2 blockers, PPIs, as well as coating motility agents discussed as treatment options. Timing of meals and proximity of last meal to sleep were discussed. If symptoms persist, a formal gastrointestinal evaluation is needed.  \par \par problem 4: questionable sleep study - no snoring\par -based on her fatigue, poor sleep, silent reflux, questionable snoring, and elevated Mallampati class\par -she is being set up for an at home sleep study\par -Discussed the risks/associations with coronary artery disease, atrial fibrillation, arrhythmia, memory loss, issues with concentration, hypertension, nocturia, chronic reflux/Calvin’s esophagus some but not all inclusive. Treatment options discussed including CPAP/BiPAP machine, oral appliance, ProVent therapy, Oxy-Aid by [employer], new technologies, or positional sleep.\par \par problem 5: overweight, improved \par -Recommend "Muniq" OTC Supplement for weight loss, energy levels, and blood sugar levels \par Weight loss, exercise, and diet control were discussed and are highly encouraged. Treatment options were given such as, aqua therapy, and contacting a nutritionist. Recommended to use the elliptical, stationary bike, less use of treadmill. Mindful eating was explained to the patient Obesity is associated with worsening asthma, shortness of breath, and potential for cardiac disease, diabetes, and other underlying medical conditions. \par \par problem 6: health maintenance \par -s/p Moderna COVID 19 vaccine x 2 (booster on hold) \par -recommended yearly flu shot after October 15 2021 \par -recommended strep pneumonia vaccines: Prevnar-13 vaccine, followed by Pneumo vaccine 23 one year following\par -recommended early intervention for URIs\par -recommended regular osteoporosis evaluations\par -recommended early dermatological evaluations\par -recommended after the age of 50 to the age of 70, colonoscopy every 5 years \par \par F/U in 6 months\par She is encouraged to call with any changes, concerns, or questions

## 2022-04-14 NOTE — PHYSICAL EXAM
[No Acute Distress] : no acute distress [Normal Oropharynx] : normal oropharynx [III] : Mallampati Class: III [Normal Appearance] : normal appearance [No Neck Mass] : no neck mass [Normal Rate/Rhythm] : normal rate/rhythm [Normal S1, S2] : normal s1, s2 [No Murmurs] : no murmurs [No Resp Distress] : no resp distress [Clear to Auscultation Bilaterally] : clear to auscultation bilaterally [No Abnormalities] : no abnormalities [Benign] : benign [Normal Gait] : normal gait [No Clubbing] : no clubbing [No Cyanosis] : no cyanosis [No Edema] : no edema [FROM] : FROM [Normal Color/ Pigmentation] : normal color/ pigmentation [No Focal Deficits] : no focal deficits [Oriented x3] : oriented x3 [Normal Affect] : normal affect [TextBox_2] : OW  [TextBox_68] : I:E 1:3; Clear

## 2022-04-15 ENCOUNTER — TRANSCRIPTION ENCOUNTER (OUTPATIENT)
Age: 52
End: 2022-04-15

## 2022-04-26 ENCOUNTER — OUTPATIENT (OUTPATIENT)
Dept: OUTPATIENT SERVICES | Facility: HOSPITAL | Age: 52
LOS: 1 days | End: 2022-04-26
Payer: COMMERCIAL

## 2022-04-26 ENCOUNTER — APPOINTMENT (OUTPATIENT)
Dept: CT IMAGING | Facility: CLINIC | Age: 52
End: 2022-04-26
Payer: COMMERCIAL

## 2022-04-26 DIAGNOSIS — R05.3 CHRONIC COUGH: ICD-10-CM

## 2022-04-26 DIAGNOSIS — Z00.8 ENCOUNTER FOR OTHER GENERAL EXAMINATION: ICD-10-CM

## 2022-04-26 PROCEDURE — 71250 CT THORAX DX C-: CPT | Mod: 26

## 2022-04-26 PROCEDURE — 71250 CT THORAX DX C-: CPT

## 2022-04-27 ENCOUNTER — TRANSCRIPTION ENCOUNTER (OUTPATIENT)
Age: 52
End: 2022-04-27

## 2022-04-27 ENCOUNTER — NON-APPOINTMENT (OUTPATIENT)
Age: 52
End: 2022-04-27

## 2022-07-25 ENCOUNTER — APPOINTMENT (OUTPATIENT)
Dept: PULMONOLOGY | Facility: CLINIC | Age: 52
End: 2022-07-25

## 2022-07-25 ENCOUNTER — NON-APPOINTMENT (OUTPATIENT)
Age: 52
End: 2022-07-25

## 2022-07-25 VITALS
HEIGHT: 62 IN | OXYGEN SATURATION: 98 % | RESPIRATION RATE: 16 BRPM | TEMPERATURE: 97.1 F | SYSTOLIC BLOOD PRESSURE: 110 MMHG | WEIGHT: 150 LBS | HEART RATE: 72 BPM | DIASTOLIC BLOOD PRESSURE: 70 MMHG | BODY MASS INDEX: 27.6 KG/M2

## 2022-07-25 PROCEDURE — 95012 NITRIC OXIDE EXP GAS DETER: CPT

## 2022-07-25 PROCEDURE — 94010 BREATHING CAPACITY TEST: CPT

## 2022-07-25 PROCEDURE — 99214 OFFICE O/P EST MOD 30 MIN: CPT | Mod: 25

## 2022-07-25 RX ORDER — FAMOTIDINE 20 MG/1
20 TABLET, FILM COATED ORAL
Qty: 180 | Refills: 0 | Status: ACTIVE | COMMUNITY
Start: 2022-02-15

## 2022-07-25 RX ORDER — OMEPRAZOLE 40 MG/1
40 CAPSULE, DELAYED RELEASE ORAL
Qty: 30 | Refills: 0 | Status: ACTIVE | COMMUNITY
Start: 2022-04-13

## 2022-07-25 RX ORDER — SPIRONOLACTONE 25 MG/1
25 TABLET ORAL
Qty: 90 | Refills: 0 | Status: ACTIVE | COMMUNITY
Start: 2022-06-20

## 2022-07-25 RX ORDER — ATORVASTATIN CALCIUM 10 MG/1
10 TABLET, FILM COATED ORAL
Qty: 90 | Refills: 0 | Status: ACTIVE | COMMUNITY
Start: 2021-11-11

## 2022-07-25 RX ORDER — AZITHROMYCIN 250 MG/1
250 TABLET, FILM COATED ORAL
Qty: 6 | Refills: 0 | Status: DISCONTINUED | COMMUNITY
Start: 2022-06-05

## 2022-07-25 RX ORDER — SPIRONOLACTONE 50 MG/1
50 TABLET ORAL
Qty: 90 | Refills: 0 | Status: ACTIVE | COMMUNITY
Start: 2022-05-30

## 2022-07-25 NOTE — ASSESSMENT
[FreeTextEntry1] : Ms. Sams is a 52 year old female with a history of asthma, allergy, GERD, OW, Eosinophilic Esophagitis, HTN and Cholesterol - noncompliant - intermittent asthma sx, #1 issue sicca syndrome/ L shoulder issues/ weight\par \par Her chronic cough is stable and multifactorial due to:\par -moderate persistent asthma\par -post nasal drip/allergies\par -reflux \par \par Problem 1A sicca Sx\par -s/p rheumatologist evaluation- wnl \par -s/p ENT evaluation ([facility])\par -recommended mouth kote tea \par -recommended oral pathologist ([facility]/ [facility]) \par \par problem 1: moderate persistent asthma, (semi compliance)\par -continue Symbicort (160) 2 inhalations BID PRN \par -continue ProAir 2 inhalations Q6H\par -continue to use Singulair 10 mg before bed\par \par -Asthma is  believed to be caused by inherited (genetic) and environmental factor, but its exact cause is unknown. Asthma may be triggered by allergens, lung infections, or irritants in the air. Asthma triggers are different for each person\par -Inhaler technique reviewed as well as oral hygiene techniques reviewed with patient. Avoidance of cold air, extremes of temperature, rescue inhaler should be used before exercise. Order of medication reviewed with patient. Recommended use of a cool mist humidifier in the bedroom. \par \par problem 2: allergies/post nasal drip syndrome\par  -Add Olopatadine 0.2% eye drops, one drop in each eye BID \par -continue to use nasal saline washer \par -continue to use Nasonex 1 sniff each nostril BID\par -continue to use Astelin (0.15) nasal spray 1 sniff each nostril BID\par -continue Claritin 10mg QHS\par -Environmental measures for allergies were encouraged including mattress and pillow cover, air purifier, and environmental controls. \par \par problem 3: GERD - imporved \par -continue to use OTC Pepcid 40mg QHS (regular use) \par -recommended to chew on DGL before meals as well\par -Rule of 2s: avoid eating too much, eating too late, eating too spicy, eating two hours before bed\par -Things to avoid including overeating, spicy foods, tight clothing, eating within three hours of bed, this list is not all inclusive. \par -For treatment of reflux, possible options discussed including diet control, H2 blockers, PPIs, as well as coating motility agents discussed as treatment options. Timing of meals and proximity of last meal to sleep were discussed. If symptoms persist, a formal gastrointestinal evaluation is needed.  \par \par problem 4: questionable sleep study - no snoring\par -based on her fatigue, poor sleep, silent reflux, questionable snoring, and elevated Mallampati class\par -she is being set up for an at home sleep study\par -Discussed the risks/associations with coronary artery disease, atrial fibrillation, arrhythmia, memory loss, issues with concentration, hypertension, nocturia, chronic reflux/Calvin’s esophagus some but not all inclusive. Treatment options discussed including CPAP/BiPAP machine, oral appliance, ProVent therapy, Oxy-Aid by [employer], new technologies, or positional sleep.\par \par problem 5: overweight, improved \par Recommended "10-Day Detox" by Eulalio Santana for 2-3 weeks followed by probiotics \par -Recommend "Muniq" OTC Supplement for weight loss, energy levels, and blood sugar levels \par Weight loss, exercise, and diet control were discussed and are highly encouraged. Treatment options were given such as, aqua therapy, and contacting a nutritionist. Recommended to use the elliptical, stationary bike, less use of treadmill. Mindful eating was explained to the patient Obesity is associated with worsening asthma, shortness of breath, and potential for cardiac disease, diabetes, and other underlying medical conditions. \par \par problem 6: health maintenance \par -s/p Moderna COVID 19 vaccine x 2 (booster on hold) \par -recommended yearly flu shot after October 15 2021 \par -recommended strep pneumonia vaccines: Prevnar-13 vaccine, followed by Pneumo vaccine 23 one year following\par -recommended early intervention for URIs\par -recommended regular osteoporosis evaluations\par -recommended early dermatological evaluations\par -recommended after the age of 50 to the age of 70, colonoscopy every 5 years \par \par F/U in 6 months\par She is encouraged to call with any changes, concerns, or questions

## 2022-07-25 NOTE — HISTORY OF PRESENT ILLNESS
[FreeTextEntry1] : Ms. Sams is a 52 year old female presenting to the office today for a follow up visit for allergies, asthma, chronic cough, GERD, and poor sleep. Her chief complaint is \par \par -she notes currently off Rx for asthma\par -she notes use of allerclear for sinus issues \par -she notes energy level is stable \par -she notes regular exercise \par -she notes recent weight gain\par -she notes diet is stable \par -she notes persistent abnormal burning sensation in her mouth \par -she notes persistent dry eyes\par -she denies palpitation\par -she notes mild dysphonia due to karaoke\par -she notes persistent shoulder issues\par -she notes persistent reflux \par -she notes poor quality of sleep due to shoulder issues \par \par -She denies any headaches, nausea, vomiting, fever, chills, sweats, chest pains, chest pressure, diarrhea, constipation, dysphagia, dizziness, leg swelling, leg pain, itchy ears, heartburn, or sour taste in the mouth.

## 2022-07-25 NOTE — PROCEDURE
[FreeTextEntry1] : FENO was 24; normal value being less than 25\par Fractional exhaled nitric oxide (FENO) is regarded as a simple, noninvasive method for assessing eosinophilic airway inflammation. Produced by a variety of cells within the lung, nitric oxide (NO) concentrations are generally low in healthy individuals. However, high concentrations of NO appear to be involved in nonspecific host defense mechanisms and chronic inflammatory diseases such as asthma. The American Thoracic Society (ATS) therefore has recommended using FENO to aid in the diagnosis and monitoring of eosinophilic airway inflammation and asthma, and for identifying steroid responsive individuals whose chronic respiratory symptoms may be airway inflammation.\par \par PFT reveals normal flows, with an FEV1 of 2.31L, which is 88% of predicted, with normal flow volume loop

## 2022-07-25 NOTE — ADDENDUM
[FreeTextEntry1] : Documented by Hector Hollins acting as a scribe for Dr. Gus Tovar on 07/25/2022 \par \par All medical record entries made by the Scribe were at my, Dr. Gus Tovar's, direction and personally dictated by me on 07/25/2022 . I have reviewed the chart and agree that the record accurately reflects my personal performance of the history, physical exam, assessment and plan. I have also personally directed, reviewed, and agree with the discharge instructions

## 2022-08-18 ENCOUNTER — APPOINTMENT (OUTPATIENT)
Dept: PULMONOLOGY | Facility: CLINIC | Age: 52
End: 2022-08-18

## 2022-12-21 ENCOUNTER — OFFICE (OUTPATIENT)
Dept: URBAN - METROPOLITAN AREA CLINIC 35 | Facility: CLINIC | Age: 52
Setting detail: OPHTHALMOLOGY
End: 2022-12-21
Payer: COMMERCIAL

## 2022-12-21 ENCOUNTER — RX ONLY (RX ONLY)
Age: 52
End: 2022-12-21

## 2022-12-21 VITALS — HEIGHT: 55 IN

## 2022-12-21 DIAGNOSIS — H16.223: ICD-10-CM

## 2022-12-21 DIAGNOSIS — H01.001: ICD-10-CM

## 2022-12-21 DIAGNOSIS — H52.13: ICD-10-CM
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
## 2022-12-21 DIAGNOSIS — H25.013: ICD-10-CM

## 2022-12-21 DIAGNOSIS — H16.423: ICD-10-CM

## 2022-12-21 DIAGNOSIS — H11.153: ICD-10-CM

## 2022-12-21 DIAGNOSIS — H01.004: ICD-10-CM

## 2022-12-21 DIAGNOSIS — H52.4: ICD-10-CM

## 2022-12-21 PROCEDURE — 92014 COMPRE OPH EXAM EST PT 1/>: CPT | Performed by: OPHTHALMOLOGY

## 2022-12-21 PROCEDURE — 83861 MICROFLUID ANALY TEARS: CPT | Performed by: OPHTHALMOLOGY

## 2022-12-21 PROCEDURE — 92015 DETERMINE REFRACTIVE STATE: CPT | Performed by: OPHTHALMOLOGY

## 2022-12-21 PROCEDURE — 55555 MISCELLANEOUS CHARGES: CPT | Performed by: OPHTHALMOLOGY

## 2022-12-21 ASSESSMENT — REFRACTION_CURRENTRX
OD_ADD: +1.00
OD_SPHERE: -5.50
OS_SPHERE: -5.75
OS_OVR_VA: 20/
OD_VPRISM_DIRECTION: PROGS
OS_CYLINDER: SPHERE
OD_AXIS: 074
OS_ADD: +1.00
OS_VPRISM_DIRECTION: PROGS
OS_AXIS: 000
OD_CYLINDER: -0.25
OD_OVR_VA: 20/

## 2022-12-21 ASSESSMENT — REFRACTION_MANIFEST
OS_CYLINDER: -0.25
OS_VA1: 20/20-1
OS_CYLINDER: SPHERE
OS_AXIS: 015
OD_CYLINDER: -0.25
OS_VA1: 20/20
OS_CYLINDER: SPHERE
OS_SPHERE: -6.00
OD_AXIS: 140
OD_AXIS: 115
OD_SPHERE: -5.75
OD_ADD: +1.75
OD_SPHERE: -5.75
OS_CYLINDER: SPHERE
OS_VA1: 20/20
OS_ADD: +2.00
OD_SPHERE: -5.00
OD_VA1: 20/20
OD_SPHERE: -5.50
OD_AXIS: 105
OD_VA1: 20/20
OD_CYLINDER: -0.25
OS_SPHERE: -5.25
OS_CYLINDER: -0.25
OU_VA: 20/20
OS_SPHERE: -6.75
OS_SPHERE: -6.00
OD_CYLINDER: SPHERE
OS_AXIS: 15
OD_VA1: 20/20
OS_AXIS: 165
OS_SPHERE: -6.00
OD_VA1: 20/20
OD_ADD: +2.00
OS_ADD: +1.75
OS_VA1: 20/20
OD_CYLINDER: -0.25
OD_SPHERE: -5.50

## 2022-12-21 ASSESSMENT — KERATOMETRY
OD_AXISANGLE_DEGREES: 081
OS_AXISANGLE_DEGREES: 098
OD_K1POWER_DIOPTERS: 43.25
OS_K2POWER_DIOPTERS: 44.50
OS_K1POWER_DIOPTERS: 44.50
METHOD_AUTO_MANUAL: AUTO
OD_K2POWER_DIOPTERS: 44.00

## 2022-12-21 ASSESSMENT — LID EXAM ASSESSMENTS
OS_MEIBOMITIS: LLL 2+
OD_MEIBOMITIS: RLL 2+
OD_BLEPHARITIS: RUL 1+
OS_BLEPHARITIS: LUL 1+

## 2022-12-21 ASSESSMENT — REFRACTION_AUTOREFRACTION
OD_SPHERE: -5.75
OS_CYLINDER: +0.50
OD_CYLINDER: +0.25
OD_SPHERE: -6.25
OS_AXIS: 167
OD_AXIS: 067
OD_CYLINDER: -0.25
OS_AXIS: 104
OD_AXIS: 114
OS_SPHERE: -6.75
OS_CYLINDER: -0.25
OS_SPHERE: -6.50

## 2022-12-21 ASSESSMENT — AXIALLENGTH_DERIVED
OS_AL: 26.035
OD_AL: 26.1901
OS_AL: 25.9758
OD_AL: 26.0706
OS_AL: 25.7999
OD_AL: 26.0706
OD_AL: 25.9522
OD_AL: 26.0706
OS_AL: 25.7999

## 2022-12-21 ASSESSMENT — VASCULARIZATION
OD_VASCULARIZATION: PANNUS PERIPHERAL SUPERFICIAL
OS_VASCULARIZATION: PANNUS PERIPHERAL SUPERFICIAL

## 2022-12-21 ASSESSMENT — TONOMETRY
OS_IOP_MMHG: 17
OD_IOP_MMHG: 17

## 2022-12-21 ASSESSMENT — SPHEQUIV_DERIVED
OD_SPHEQUIV: -6.125
OD_SPHEQUIV: -5.875
OD_SPHEQUIV: -5.875
OS_SPHEQUIV: -6.625
OD_SPHEQUIV: -5.625
OS_SPHEQUIV: -6.125
OS_SPHEQUIV: -6.125
OS_SPHEQUIV: -6.5
OD_SPHEQUIV: -5.875

## 2022-12-21 ASSESSMENT — VISUAL ACUITY
OD_BCVA: 20/25+1
OS_BCVA: 20/20-2

## 2022-12-21 ASSESSMENT — CONFRONTATIONAL VISUAL FIELD TEST (CVF)
OD_FINDINGS: FULL
OS_FINDINGS: FULL

## 2022-12-21 ASSESSMENT — SUPERFICIAL PUNCTATE KERATITIS (SPK)
OS_SPK: 1+
OD_SPK: 1+

## 2023-01-23 ENCOUNTER — NON-APPOINTMENT (OUTPATIENT)
Age: 53
End: 2023-01-23

## 2023-01-23 ENCOUNTER — APPOINTMENT (OUTPATIENT)
Dept: PULMONOLOGY | Facility: CLINIC | Age: 53
End: 2023-01-23
Payer: COMMERCIAL

## 2023-01-23 VITALS
WEIGHT: 150 LBS | OXYGEN SATURATION: 98 % | HEART RATE: 80 BPM | SYSTOLIC BLOOD PRESSURE: 118 MMHG | BODY MASS INDEX: 27.6 KG/M2 | HEIGHT: 62 IN | DIASTOLIC BLOOD PRESSURE: 80 MMHG | TEMPERATURE: 97.6 F | RESPIRATION RATE: 16 BRPM

## 2023-01-23 DIAGNOSIS — K20.0 EOSINOPHILIC ESOPHAGITIS: ICD-10-CM

## 2023-01-23 PROCEDURE — 94010 BREATHING CAPACITY TEST: CPT

## 2023-01-23 PROCEDURE — 95012 NITRIC OXIDE EXP GAS DETER: CPT

## 2023-01-23 PROCEDURE — 99214 OFFICE O/P EST MOD 30 MIN: CPT | Mod: 25

## 2023-01-23 NOTE — PROCEDURE
[FreeTextEntry1] : \par CT (APR.17.2022) IMPRESSION: clear lungs\par \par PFT reveals normal flows, with an FEV1 of  2.46 L, which is  94 % of predicted, with normal flow volume loop \par \par FENO was 15 ; normal value being less than 25\par Fractional exhaled nitric oxide (FENO) is regarded as a simple, noninvasive method for assessing eosinophilic airway inflammation. Produced by a variety of cells within the lung, nitric oxide (NO) concentrations are generally low in healthy individuals. However, high concentrations of NO appear to be involved in nonspecific host defense mechanisms and chronic inflammatory diseases such as asthma. The American Thoracic Society (ATS) therefore has recommended using FENO to aid in the diagnosis and monitoring of eosinophilic airway inflammation and asthma, and for identifying steroid responsive individuals whose chronic respiratory symptoms may be airway inflammation.

## 2023-01-23 NOTE — HISTORY OF PRESENT ILLNESS
[FreeTextEntry1] : Ms. Sams is a 52 year old female presenting to the office today for a follow up visit for allergies, asthma, chronic cough, GERD, and poor sleep. Her chief complaint is \par \par -she notes energy levels are good \par -she notes exercising \par -she notes her weight is stable \par -she notes eating fairly well \par -she notes her energy levels are good \par -she notes intermittent cough and thinks its reflux driven \par -she notes reflux comes in waves \par -she notes using saline she feels much better \par -she notes intermittent hoarseness \par -she notes sometimes she feels like her voice is scratchy at night \par -she notes saline and probiotics help with reflux and scratchy voice \par -she notes sleep quality is good \par -she notes her biggest complain is her weight \par -she notes her shoulder is fixed now \par \par -She denies any visual issues, headaches, nausea, vomiting, fever, chills, sweats, chest pains, chest pressure, diarrhea, constipation, dysphagia, myalgia, dizziness, leg swelling, leg pain, itchy eyes, itchy ears, heartburn, reflux, or sour taste in the mouth.

## 2023-01-23 NOTE — ASSESSMENT
[FreeTextEntry1] : Ms. Sams is a 52 year old female with a history of asthma, allergy, GERD, OW, Eosinophilic Esophagitis, HTN and Cholesterol - noncompliant - intermittent asthma sx, #1 issue weight\par \par Her chronic cough is stable and multifactorial due to:\par -moderate persistent asthma\par -post nasal drip/allergies\par -reflux \par \par Problem 1A sicca Sx\par -s/p rheumatologist evaluation- wnl \par -s/p ENT evaluation ([facility])\par -recommended mouth kote tea \par -recommended oral pathologist (Sowmya/ Hernán) \par \par problem 1: moderate persistent asthma, (semi compliance)\par -continue Symbicort (160) 2 inhalations BID PRN \par -continue ProAir 2 inhalations Q6H\par -continue to use Singulair 10 mg before bed\par \par -Asthma is  believed to be caused by inherited (genetic) and environmental factor, but its exact cause is unknown. Asthma may be triggered by allergens, lung infections, or irritants in the air. Asthma triggers are different for each person\par -Inhaler technique reviewed as well as oral hygiene techniques reviewed with patient. Avoidance of cold air, extremes of temperature, rescue inhaler should be used before exercise. Order of medication reviewed with patient. Recommended use of a cool mist humidifier in the bedroom. \par \par problem 2: allergies/post nasal drip syndrome\par  -Add Olopatadine 0.2% eye drops, one drop in each eye BID \par -continue to use nasal saline washer \par -continue to use Nasonex 1 sniff each nostril BID\par -continue to use Astelin (0.15) nasal spray 1 sniff each nostril BID\par -continue Claritin 10mg QHS\par -Environmental measures for allergies were encouraged including mattress and pillow cover, air purifier, and environmental controls. \par \par problem 3: GERD - imporved \par -continue to use OTC Pepcid 40mg QHS (regular use) \par -recommended to chew on DGL before meals as well\par -Rule of 2s: avoid eating too much, eating too late, eating too spicy, eating two hours before bed\par -Things to avoid including overeating, spicy foods, tight clothing, eating within three hours of bed, this list is not all inclusive. \par -For treatment of reflux, possible options discussed including diet control, H2 blockers, PPIs, as well as coating motility agents discussed as treatment options. Timing of meals and proximity of last meal to sleep were discussed. If symptoms persist, a formal gastrointestinal evaluation is needed.  \par \par problem 4: questionable sleep study - no snoring\par -based on her fatigue, poor sleep, silent reflux, questionable snoring, and elevated Mallampati class\par -she is being set up for an at home sleep study\par -Discussed the risks/associations with coronary artery disease, atrial fibrillation, arrhythmia, memory loss, issues with concentration, hypertension, nocturia, chronic reflux/Calvin’s esophagus some but not all inclusive. Treatment options discussed including CPAP/BiPAP machine, oral appliance, ProVent therapy, Oxy-Aid by Respitec, new technologies, or positional sleep.\par \par problem 5: overweight, improved \par Recommended "10-Day Detox" by Eulalio Santana for 2-3 weeks followed by probiotics \par -Recommend "Muniq" OTC Supplement for weight loss, energy levels, and blood sugar levels \par Weight loss, exercise, and diet control were discussed and are highly encouraged. Treatment options were given such as, aqua therapy, and contacting a nutritionist. Recommended to use the elliptical, stationary bike, less use of treadmill. Mindful eating was explained to the patient Obesity is associated with worsening asthma, shortness of breath, and potential for cardiac disease, diabetes, and other underlying medical conditions. \par \par problem 6: health maintenance \par -s/p Moderna COVID 19 vaccine x 2 (booster on hold) \par -recommended yearly flu shot after October 15 2022 \par -recommended strep pneumonia vaccines: Prevnar-13 vaccine, followed by Pneumo vaccine 23 one year following\par -recommended early intervention for URIs\par -recommended regular osteoporosis evaluations\par -recommended early dermatological evaluations\par -recommended after the age of 50 to the age of 70, colonoscopy every 5 years \par \par F/U in 6 months\par She is encouraged to call with any changes, concerns, or questions

## 2023-01-23 NOTE — ADDENDUM
[FreeTextEntry1] : Documented by Amelia Gonzalez as a scribe for Dr. Gus Tovar on 01/23/2023 \par \par All medical record entries made by the Scribe were at my, Dr. Gus Tovar's, direction and personally dictated by me on 01/23/2023 . I have reviewed the chart and agree that the record accurately reflects my personal performance of the history, physical exam, assessment and plan. I have also personally directed, reviewed, and agree with the discharge instructions.

## 2023-03-28 ENCOUNTER — RX ONLY (RX ONLY)
Age: 53
End: 2023-03-28

## 2023-03-28 ENCOUNTER — OFFICE (OUTPATIENT)
Dept: URBAN - METROPOLITAN AREA CLINIC 35 | Facility: CLINIC | Age: 53
Setting detail: OPHTHALMOLOGY
End: 2023-03-28
Payer: COMMERCIAL

## 2023-03-28 DIAGNOSIS — H01.004: ICD-10-CM

## 2023-03-28 DIAGNOSIS — H01.001: ICD-10-CM

## 2023-03-28 DIAGNOSIS — H11.153: ICD-10-CM

## 2023-03-28 DIAGNOSIS — H25.013: ICD-10-CM

## 2023-03-28 DIAGNOSIS — H43.391: ICD-10-CM

## 2023-03-28 DIAGNOSIS — H16.423: ICD-10-CM

## 2023-03-28 PROCEDURE — 92014 COMPRE OPH EXAM EST PT 1/>: CPT | Performed by: OPHTHALMOLOGY

## 2023-03-28 ASSESSMENT — SUPERFICIAL PUNCTATE KERATITIS (SPK)
OS_SPK: 1+
OD_SPK: 1+

## 2023-03-28 ASSESSMENT — LID EXAM ASSESSMENTS
OS_BLEPHARITIS: LUL 1+
OD_BLEPHARITIS: RUL 1+
OD_MEIBOMITIS: RLL 2+
OS_MEIBOMITIS: LLL 2+

## 2023-03-28 ASSESSMENT — CONFRONTATIONAL VISUAL FIELD TEST (CVF)
OS_FINDINGS: FULL
OD_FINDINGS: FULL

## 2023-03-29 ASSESSMENT — REFRACTION_MANIFEST
OU_VA: 20/20
OS_ADD: +2.00
OD_AXIS: 105
OS_CYLINDER: -0.25
OS_CYLINDER: SPHERE
OD_VA1: 20/20
OD_SPHERE: -5.75
OS_AXIS: 165
OS_VA1: 20/20-1
OS_SPHERE: -6.75
OS_CYLINDER: -0.25
OD_ADD: +2.00
OD_SPHERE: -5.50
OD_CYLINDER: -0.25
OD_AXIS: 115
OD_CYLINDER: -0.25
OS_AXIS: 15
OD_VA1: 20/20
OS_AXIS: 015
OD_SPHERE: -5.50
OS_CYLINDER: SPHERE
OD_SPHERE: -5.00
OS_SPHERE: -6.00
OS_VA1: 20/20
OD_VA1: 20/20
OD_CYLINDER: -0.25
OS_VA1: 20/20
OS_ADD: +1.75
OS_VA1: 20/20
OD_VA1: 20/20
OS_SPHERE: -5.25
OD_CYLINDER: SPHERE
OS_CYLINDER: SPHERE
OS_SPHERE: -6.00
OD_SPHERE: -5.75
OD_AXIS: 140
OS_SPHERE: -6.00
OD_ADD: +1.75

## 2023-03-29 ASSESSMENT — KERATOMETRY
OS_AXISANGLE_DEGREES: 096
OD_K1POWER_DIOPTERS: 43.00
OS_K1POWER_DIOPTERS: 43.50
OS_K2POWER_DIOPTERS: 44.25
METHOD_AUTO_MANUAL: AUTO
OD_K2POWER_DIOPTERS: 44.00
OD_AXISANGLE_DEGREES: 078

## 2023-03-29 ASSESSMENT — REFRACTION_AUTOREFRACTION
OD_CYLINDER: +0.25
OD_SPHERE: -5.75
OD_CYLINDER: -0.25
OS_CYLINDER: +0.50
OS_CYLINDER: -0.25
OD_AXIS: 067
OD_SPHERE: -6.25
OS_SPHERE: -6.25
OS_AXIS: 104
OS_AXIS: 023
OS_SPHERE: -6.75
OD_AXIS: 151

## 2023-03-29 ASSESSMENT — REFRACTION_CURRENTRX
OD_OVR_VA: 20/
OD_SPHERE: -5.50
OS_OVR_VA: 20/
OD_CYLINDER: -0.25
OS_SPHERE: -5.75
OD_AXIS: 074
OS_OVR_VA: 20/
OS_ADD: +1.00
OS_AXIS: 000
OD_CYLINDER: -0.25
OD_VPRISM_DIRECTION: PROGS
OS_AXIS: 093
OS_SPHERE: -5.75
OD_AXIS: 074
OS_VPRISM_DIRECTION: PROGS
OS_CYLINDER: -0.25
OD_ADD: +1.00
OD_OVR_VA: 20/
OD_SPHERE: -4.50
OS_CYLINDER: SPHERE

## 2023-03-29 ASSESSMENT — AXIALLENGTH_DERIVED
OD_AL: 26.1268
OS_AL: 26.2571
OS_AL: 26.0774
OD_AL: 26.0079
OS_AL: 26.1969
OD_AL: 26.1268
OD_AL: 26.1268
OS_AL: 26.0774
OD_AL: 26.2468

## 2023-03-29 ASSESSMENT — SPHEQUIV_DERIVED
OD_SPHEQUIV: -5.875
OD_SPHEQUIV: -6.125
OD_SPHEQUIV: -5.875
OD_SPHEQUIV: -5.875
OS_SPHEQUIV: -6.125
OS_SPHEQUIV: -6.375
OS_SPHEQUIV: -6.125
OS_SPHEQUIV: -6.5
OD_SPHEQUIV: -5.625

## 2023-03-29 ASSESSMENT — VISUAL ACUITY
OS_BCVA: 20/20-2
OD_BCVA: 20/20-2

## 2023-04-17 ENCOUNTER — OFFICE (OUTPATIENT)
Dept: URBAN - METROPOLITAN AREA CLINIC 32 | Facility: CLINIC | Age: 53
Setting detail: OPHTHALMOLOGY
End: 2023-04-17
Payer: COMMERCIAL

## 2023-04-17 DIAGNOSIS — H43.393: ICD-10-CM

## 2023-04-17 DIAGNOSIS — H43.823: ICD-10-CM

## 2023-04-17 DIAGNOSIS — H52.13: ICD-10-CM

## 2023-04-17 PROCEDURE — 92201 OPSCPY EXTND RTA DRAW UNI/BI: CPT | Performed by: OPHTHALMOLOGY

## 2023-04-17 PROCEDURE — 92134 CPTRZ OPH DX IMG PST SGM RTA: CPT | Performed by: OPHTHALMOLOGY

## 2023-04-17 PROCEDURE — 92012 INTRM OPH EXAM EST PATIENT: CPT | Performed by: OPHTHALMOLOGY

## 2023-04-17 ASSESSMENT — LID EXAM ASSESSMENTS
OD_BLEPHARITIS: RUL 1+
OS_BLEPHARITIS: LUL 1+
OS_MEIBOMITIS: LLL 2+
OD_MEIBOMITIS: RLL 2+

## 2023-04-17 ASSESSMENT — REFRACTION_MANIFEST
OS_SPHERE: -6.75
OD_VA1: 20/20
OD_AXIS: 105
OS_ADD: +2.00
OS_SPHERE: -6.00
OD_SPHERE: -5.00
OD_ADD: +1.75
OU_VA: 20/20
OS_VA1: 20/20
OD_VA1: 20/20
OS_VA1: 20/20-1
OD_SPHERE: -5.50
OS_SPHERE: -6.00
OD_CYLINDER: -0.25
OS_AXIS: 165
OD_CYLINDER: SPHERE
OS_VA1: 20/20
OD_CYLINDER: -0.25
OS_CYLINDER: -0.25
OD_VA1: 20/20
OD_AXIS: 140
OS_CYLINDER: SPHERE
OD_SPHERE: -5.75
OS_CYLINDER: SPHERE
OS_SPHERE: -5.25
OS_ADD: +1.75
OD_ADD: +2.00
OS_AXIS: 015
OS_CYLINDER: -0.25
OS_SPHERE: -6.00
OD_SPHERE: -5.75
OS_VA1: 20/20
OD_AXIS: 115
OS_AXIS: 15
OS_CYLINDER: SPHERE
OD_SPHERE: -5.50
OD_VA1: 20/20
OD_CYLINDER: -0.25

## 2023-04-17 ASSESSMENT — SPHEQUIV_DERIVED
OS_SPHEQUIV: -6.125
OD_SPHEQUIV: -5.875
OS_SPHEQUIV: -6.5
OS_SPHEQUIV: -6.375
OD_SPHEQUIV: -5.625
OD_SPHEQUIV: -6.125
OS_SPHEQUIV: -6.125
OD_SPHEQUIV: -5.875
OD_SPHEQUIV: -5.875

## 2023-04-17 ASSESSMENT — REFRACTION_CURRENTRX
OS_CYLINDER: SPHERE
OS_CYLINDER: -0.25
OS_VPRISM_DIRECTION: PROGS
OD_ADD: +1.00
OS_AXIS: 000
OD_CYLINDER: -0.25
OD_SPHERE: -4.50
OD_VPRISM_DIRECTION: PROGS
OS_SPHERE: -5.75
OD_AXIS: 074
OD_OVR_VA: 20/
OS_ADD: +1.00
OS_AXIS: 093
OD_OVR_VA: 20/
OS_SPHERE: -5.75
OS_OVR_VA: 20/
OD_SPHERE: -5.50
OD_CYLINDER: -0.25
OD_AXIS: 074
OS_OVR_VA: 20/

## 2023-04-17 ASSESSMENT — AXIALLENGTH_DERIVED
OD_AL: 26.2468
OS_AL: 26.0774
OD_AL: 26.1268
OD_AL: 26.1268
OS_AL: 26.0774
OD_AL: 26.0079
OD_AL: 26.1268
OS_AL: 26.2571
OS_AL: 26.1969

## 2023-04-17 ASSESSMENT — KERATOMETRY
OD_K1POWER_DIOPTERS: 43.00
OS_AXISANGLE_DEGREES: 096
OS_K1POWER_DIOPTERS: 43.50
OS_K2POWER_DIOPTERS: 44.25
OD_K2POWER_DIOPTERS: 44.00
OD_AXISANGLE_DEGREES: 078
METHOD_AUTO_MANUAL: AUTO

## 2023-04-17 ASSESSMENT — REFRACTION_AUTOREFRACTION
OS_AXIS: 023
OS_SPHERE: -6.25
OD_SPHERE: -5.75
OD_AXIS: 151
OS_AXIS: 104
OS_CYLINDER: +0.50
OS_SPHERE: -6.75
OD_AXIS: 067
OD_CYLINDER: +0.25
OD_SPHERE: -6.25
OD_CYLINDER: -0.25
OS_CYLINDER: -0.25

## 2023-04-17 ASSESSMENT — VISUAL ACUITY
OD_BCVA: 20/20-1
OS_BCVA: 20/20

## 2023-04-17 ASSESSMENT — SUPERFICIAL PUNCTATE KERATITIS (SPK)
OD_SPK: 1+
OS_SPK: 1+

## 2023-04-17 ASSESSMENT — VASCULARIZATION
OS_VASCULARIZATION: PANNUS PERIPHERAL SUPERFICIAL
OD_VASCULARIZATION: PANNUS PERIPHERAL SUPERFICIAL

## 2023-05-08 ENCOUNTER — OFFICE (OUTPATIENT)
Dept: URBAN - METROPOLITAN AREA CLINIC 32 | Facility: CLINIC | Age: 53
Setting detail: OPHTHALMOLOGY
End: 2023-05-08
Payer: COMMERCIAL

## 2023-05-08 DIAGNOSIS — H43.393: ICD-10-CM

## 2023-05-08 DIAGNOSIS — H52.13: ICD-10-CM

## 2023-05-08 DIAGNOSIS — H43.823: ICD-10-CM

## 2023-05-08 PROCEDURE — 92134 CPTRZ OPH DX IMG PST SGM RTA: CPT | Performed by: OPHTHALMOLOGY

## 2023-05-08 PROCEDURE — 92012 INTRM OPH EXAM EST PATIENT: CPT | Performed by: OPHTHALMOLOGY

## 2023-05-08 ASSESSMENT — SUPERFICIAL PUNCTATE KERATITIS (SPK)
OS_SPK: 1+
OD_SPK: 1+

## 2023-05-08 ASSESSMENT — KERATOMETRY
OD_K2POWER_DIOPTERS: 44.00
OS_AXISANGLE_DEGREES: 096
OD_K1POWER_DIOPTERS: 43.00
OD_AXISANGLE_DEGREES: 078
OS_K2POWER_DIOPTERS: 44.25
OS_K1POWER_DIOPTERS: 43.50
METHOD_AUTO_MANUAL: AUTO

## 2023-05-08 ASSESSMENT — REFRACTION_AUTOREFRACTION
OD_SPHERE: -6.25
OS_AXIS: 023
OD_AXIS: 067
OS_SPHERE: -6.25
OD_CYLINDER: -0.25
OD_CYLINDER: +0.25
OS_SPHERE: -6.75
OD_AXIS: 151
OD_SPHERE: -5.75
OS_AXIS: 104
OS_CYLINDER: -0.25
OS_CYLINDER: +0.50

## 2023-05-08 ASSESSMENT — LID EXAM ASSESSMENTS
OD_MEIBOMITIS: RLL 2+
OS_MEIBOMITIS: LLL 2+
OS_BLEPHARITIS: LUL 1+
OD_BLEPHARITIS: RUL 1+

## 2023-05-08 ASSESSMENT — VISUAL ACUITY
OS_BCVA: 20/20
OD_BCVA: 20/25+

## 2023-05-08 ASSESSMENT — SPHEQUIV_DERIVED
OD_SPHEQUIV: -5.875
OS_SPHEQUIV: -6.5
OD_SPHEQUIV: -6.125
OS_SPHEQUIV: -6.375

## 2023-05-08 ASSESSMENT — AXIALLENGTH_DERIVED
OD_AL: 26.2468
OS_AL: 26.2571
OS_AL: 26.1969
OD_AL: 26.1268

## 2023-05-08 ASSESSMENT — CONFRONTATIONAL VISUAL FIELD TEST (CVF)
OD_FINDINGS: FULL
OS_FINDINGS: FULL

## 2023-07-24 ENCOUNTER — APPOINTMENT (OUTPATIENT)
Dept: PULMONOLOGY | Facility: CLINIC | Age: 53
End: 2023-07-24
Payer: COMMERCIAL

## 2023-07-24 VITALS
DIASTOLIC BLOOD PRESSURE: 80 MMHG | HEART RATE: 60 BPM | TEMPERATURE: 96.2 F | BODY MASS INDEX: 27.97 KG/M2 | SYSTOLIC BLOOD PRESSURE: 124 MMHG | OXYGEN SATURATION: 98 % | WEIGHT: 152 LBS | HEIGHT: 62 IN | RESPIRATION RATE: 16 BRPM

## 2023-07-24 DIAGNOSIS — E66.3 OVERWEIGHT: ICD-10-CM

## 2023-07-24 DIAGNOSIS — J30.9 ALLERGIC RHINITIS, UNSPECIFIED: ICD-10-CM

## 2023-07-24 DIAGNOSIS — R05.3 CHRONIC COUGH: ICD-10-CM

## 2023-07-24 DIAGNOSIS — R06.02 SHORTNESS OF BREATH: ICD-10-CM

## 2023-07-24 DIAGNOSIS — J45.909 UNSPECIFIED ASTHMA, UNCOMPLICATED: ICD-10-CM

## 2023-07-24 DIAGNOSIS — K21.9 GASTRO-ESOPHAGEAL REFLUX DISEASE W/OUT ESOPHAGITIS: ICD-10-CM

## 2023-07-24 DIAGNOSIS — Z72.820 SLEEP DEPRIVATION: ICD-10-CM

## 2023-07-24 PROCEDURE — 99214 OFFICE O/P EST MOD 30 MIN: CPT | Mod: 25

## 2023-07-24 PROCEDURE — 94727 GAS DIL/WSHOT DETER LNG VOL: CPT

## 2023-07-24 PROCEDURE — 94010 BREATHING CAPACITY TEST: CPT

## 2023-07-24 PROCEDURE — 94729 DIFFUSING CAPACITY: CPT

## 2023-07-24 PROCEDURE — 95012 NITRIC OXIDE EXP GAS DETER: CPT

## 2023-07-24 RX ORDER — MELOXICAM 15 MG/1
15 TABLET ORAL
Qty: 30 | Refills: 0 | Status: ACTIVE | COMMUNITY
Start: 2023-04-19

## 2023-07-24 RX ORDER — LIDOCAINE AND PRILOCAINE 25; 25 MG/G; MG/G
2.5-2.5 CREAM TOPICAL
Qty: 30 | Refills: 0 | Status: ACTIVE | COMMUNITY
Start: 2023-06-23

## 2023-07-24 RX ORDER — COVID-19 ANTIGEN TEST
KIT MISCELLANEOUS
Qty: 8 | Refills: 0 | Status: ACTIVE | COMMUNITY
Start: 2023-04-18

## 2023-07-24 NOTE — PROCEDURE
[FreeTextEntry1] : FENO was 11; normal value being less than 25\par Fractional exhaled nitric oxide (FENO) is regarded as a simple, noninvasive method for assessing eosinophilic airway inflammation. Produced by a variety of cells within the lung, nitric oxide (NO) concentrations are generally low in healthy individuals. However, high concentrations of NO appear to be involved in nonspecific host defense mechanisms and chronic inflammatory diseases such as asthma. The American Thoracic Society (ATS) therefore has recommended using FENO to aid in the diagnosis and monitoring of eosinophilic airway inflammation and asthma, and for identifying steroid responsive individuals whose chronic respiratory symptoms may be airway inflammation. \par \par FULL PFTs reveals very obstructive flows; FEV1 was  2.23L which is 92% of predicted; mid to low lung volumes; normal diffusion of 17.9, which is 86% of predicted; normal flow volume loop ;PFT's were performed for the evaluation of SOB / asthma

## 2023-07-24 NOTE — HISTORY OF PRESENT ILLNESS
[FreeTextEntry1] : Ms. Sams is a 53 year old female presenting to the office today for a follow up visit for allergies, asthma, chronic cough, GERD, and poor sleep. Her chief complaint is \par - she notes she has been feeling good in general \par - she notes she has not taken any asthma medications in over a year and a half now \par - no chest pains / pressure\par - no diarrhea / constipation\par - no difficulty swallowing \par - she denies any sour taste in the mouth \par - she notes shes still using saline for her nose, and it helps clear her throat as well\par - she notes she feels fit and she has been working out\par - she notes her dry mouth has improved \par - she notes she takes pepcid twice a day so her reflux has been quiet \par -She denies any visual issues, headaches, nausea, vomiting, fever, chills, sweats, chest pains, chest pressure, diarrhea, constipation, dysphagia, myalgia, dizziness, leg swelling, leg pain, itchy eyes, itchy ears, heartburn, reflux, or sour taste in the mouth.

## 2023-07-24 NOTE — ASSESSMENT
[FreeTextEntry1] : Ms. 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 Asthma may be triggered by allergens, lung infections, or irritants in the air. Asthma triggers are different for each person\par -Inhaler technique reviewed as well as oral hygiene techniques reviewed with patient. Avoidance of cold air, extremes of temperature, rescue inhaler should be used before exercise. Order of medication reviewed with patient. Recommended use of a cool mist humidifier in the bedroom. \par \par problem 2: allergies/post nasal drip syndrome\par  -Add Olopatadine 0.2% eye drops, one drop in each eye BID \par -continue to use nasal saline washer \par -continue to use Nasonex 1 sniff each nostril BID\par -continue to use Astelin (0.15) nasal spray 1 sniff each nostril BID\par -continue Claritin 10mg QHS\par -Environmental measures for allergies were encouraged including mattress and pillow cover, air purifier, and environmental controls. \par \par problem 3: GERD - improved \par -continue to use OTC Pepcid 40mg QHS (regular use) \par -recommended to chew on DGL before meals as well\par -Rule of 2s: avoid eating too much, eating too late, eating too spicy, eating two hours before bed\par -Things to avoid including overeating, spicy foods, tight clothing, eating within three hours of bed, this list is not all inclusive. \par -For treatment of reflux, possible options discussed including diet control, H2 blockers, PPIs, as well as coating motility agents discussed as treatment options. Timing of meals and proximity of last meal to sleep were discussed. If symptoms persist, a formal gastrointestinal evaluation is needed.  \par \par problem 4: questionable sleep study - no snoring\par -based on her fatigue, poor sleep, silent reflux, questionable snoring, and elevated Mallampati class\par -she is being set up for an at home sleep study\par -Discussed the risks/associations with coronary artery disease, atrial fibrillation, arrhythmia, memory loss, issues with concentration, hypertension, nocturia, chronic reflux/Calvin’s esophagus some but not all inclusive. Treatment options discussed including CPAP/BiPAP machine, oral appliance, ProVent therapy, Oxy-Aid by [employer]pitec, new technologies, or positional sleep.\par \par problem 5: overweight, improved \par Recommended "10-Day Detox" by Eulalio Santana for 2-3 weeks followed by probiotics \par -Recommend "Muniq" OTC Supplement for weight loss, energy levels, and blood sugar levels \par Weight loss, exercise, and diet control were discussed and are highly encouraged. Treatment options were given such as, aqua therapy, and contacting a nutritionist. Recommended to use the elliptical, stationary bike, less use of treadmill. Mindful eating was explained to the patient Obesity is associated with worsening asthma, shortness of breath, and potential for cardiac disease, diabetes, and other underlying medical conditions. \par \par problem 6: health maintenance \par -s/p Moderna COVID 19 vaccine x 2 (booster on hold) \par -recommended yearly flu shot after October 15 2022 \par -recommended strep pneumonia vaccines: Prevnar-13 vaccine, followed by Pneumo vaccine 23 one year following\par -recommended early intervention for URIs\par -recommended regular osteoporosis evaluations\par -recommended early dermatological evaluations\par -recommended after the age of 50 to the age of 70, colonoscopy every 5 years \par \par F/U in 6 months\par She is encouraged to call with any changes, concerns, or questions

## 2023-07-24 NOTE — ADDENDUM
[FreeTextEntry1] : Documented by Sunni Mcdonald acting as a scribe for Dr. Gus Tovar on 07/24/2023 \par \par All medical record entries made by the Scribe were at my, Dr. Gus Tovar's, direction and personally dictated by me on 07/24/2023 . I have reviewed the chart and agree that the record accurately reflects my personal performance of the history, physical exam, assessment and plan. I have also personally directed, reviewed, and agree with the discharge instructions.

## 2023-07-26 ENCOUNTER — APPOINTMENT (OUTPATIENT)
Dept: ORTHOPEDIC SURGERY | Facility: CLINIC | Age: 53
End: 2023-07-26
Payer: COMMERCIAL

## 2023-07-26 VITALS — BODY MASS INDEX: 27.97 KG/M2 | HEIGHT: 62 IN | WEIGHT: 152 LBS

## 2023-07-26 PROCEDURE — 99203 OFFICE O/P NEW LOW 30 MIN: CPT

## 2023-07-26 PROCEDURE — L1833: CPT | Mod: RT

## 2023-07-26 PROCEDURE — 73564 X-RAY EXAM KNEE 4 OR MORE: CPT | Mod: RT

## 2023-07-26 NOTE — IMAGING
[de-identified] : No effusion, no warmth, no ecchymosis\par Medial retinacular and MCL  tenderness to palpation \par Range of motion 0-130 with mild anterior crepitus\par 5/5 quadriceps and hamstring strength\par Ligamentously stable; pain valgus stress\par Motor and sensory intact distally\par Non antalgic gait\par Negative Charlette\par  [Right] : right knee [There are no fractures, subluxations or dislocations. No significant abnormalities are seen] : There are no fractures, subluxations or dislocations. No significant abnormalities are seen [All Views] : anteroposterior, lateral, skyline, and anteroposterior standing

## 2023-07-26 NOTE — ASSESSMENT
[FreeTextEntry1] : Medially based right knee pain after traumatic tennis injury this morning.  Felt a snapping sensation medial aspect of the knee after coming down from a volley.  No effusion on exam today and ligaments feel stable but pain with valgus stress.  X-rays unremarkable.  Discussed hinged knee brace to support the MCL and follow-up in a week to recheck.  If still symptomatic could consider MRI.

## 2023-07-26 NOTE — HISTORY OF PRESENT ILLNESS
[10] : 10 [0] : 0 [Dull/Aching] : dull/aching [Leisure] : leisure [Nothing helps with pain getting better] : Nothing helps with pain getting better [Walking] : walking [Exercising] : exercising [] : no [FreeTextEntry1] : Rt Knee [FreeTextEntry3] : 07/26/23 [FreeTextEntry5] : Pt here for Rt Knee Injury. Pt was playing tennis was going to do an over head throw when she felt two snaps on her Rt Knee. Pt not able to bare weight as shes walking. Rt Knee clicking and snapping. No swelling or tenderness. ROM limited. No N/T.

## 2023-07-28 ENCOUNTER — APPOINTMENT (OUTPATIENT)
Dept: ORTHOPEDIC SURGERY | Facility: CLINIC | Age: 53
End: 2023-07-28
Payer: COMMERCIAL

## 2023-07-28 ENCOUNTER — APPOINTMENT (OUTPATIENT)
Dept: MRI IMAGING | Facility: CLINIC | Age: 53
End: 2023-07-28
Payer: COMMERCIAL

## 2023-07-28 VITALS — HEIGHT: 62 IN | WEIGHT: 152 LBS | BODY MASS INDEX: 27.97 KG/M2

## 2023-07-28 DIAGNOSIS — S83.411D SPRAIN OF MEDIAL COLLATERAL LIGAMENT OF RIGHT KNEE, SUBSEQUENT ENCOUNTER: ICD-10-CM

## 2023-07-28 PROCEDURE — 73721 MRI JNT OF LWR EXTRE W/O DYE: CPT | Mod: RT

## 2023-07-28 PROCEDURE — 99214 OFFICE O/P EST MOD 30 MIN: CPT

## 2023-07-28 NOTE — PHYSICAL EXAM
[4___] : quadriceps 4[unfilled]/5 [5___] : hamstring 5[unfilled]/5 [] : antalgic [Right] : right knee [There are no fractures, subluxations or dislocations. No significant abnormalities are seen] : There are no fractures, subluxations or dislocations. No significant abnormalities are seen [de-identified] : guarding on exam. equivocal pivot shift. [TWNoteComboBox7] : flexion 100 degrees [de-identified] : extension 10 degrees

## 2023-07-28 NOTE — HISTORY OF PRESENT ILLNESS
[9] : 9 [de-identified] : follow up of dr arreola.  she had mri and requested to be seen. [FreeTextEntry1] : right knee  [FreeTextEntry5] : Pt here for MRI Results right knee . Pt feeling worse than last visit. Pt sating more swelling. Pt Rt Calf starting to her not able to straighten out her Rt Leg. Pt using Playmaker brace for support.

## 2023-07-28 NOTE — ASSESSMENT
[FreeTextEntry1] : new onset right knee pain playing tennis on 7/26/23.  felt pop twice.  saw dr arreola. patient had mri this morning and asked to be seen by me because she is going on vacation.  NO REPORT.  looks to me like acl tear with mcl sprain and lateral plateau bone contusion.

## 2023-07-28 NOTE — DATA REVIEWED
[MRI] : MRI [Right] : of the right [Knee] : knee [I reviewed the films/CD] : I reviewed the films/CD

## 2023-07-31 ENCOUNTER — NON-APPOINTMENT (OUTPATIENT)
Age: 53
End: 2023-07-31

## 2023-08-07 ENCOUNTER — APPOINTMENT (OUTPATIENT)
Dept: ORTHOPEDIC SURGERY | Facility: CLINIC | Age: 53
End: 2023-08-07
Payer: COMMERCIAL

## 2023-08-07 DIAGNOSIS — S83.511D SPRAIN OF ANTERIOR CRUCIATE LIGAMENT OF RIGHT KNEE, SUBSEQUENT ENCOUNTER: ICD-10-CM

## 2023-08-07 PROCEDURE — L1852: CPT | Mod: RT

## 2023-08-07 NOTE — IMAGING
[de-identified] : No effusion Palpation: lateral joint line tenderness.  ROM: flexion 120 degrees, extension 0 degrees, guarding during exam.  Testing: +1 Lachmann, negative Varus instability, negative Valgus instability, negative Patella apprehension.  Neuro: light touch is intact throughout.  Gait: mildly antalgic.

## 2023-08-07 NOTE — HISTORY OF PRESENT ILLNESS
[0] : 0 [Dull/Aching] : dull/aching [Leisure] : leisure [Nothing helps with pain getting better] : Nothing helps with pain getting better [Walking] : walking [Exercising] : exercising [4] : 4 [Intermittent] : intermittent [de-identified] : 08/07/2023 follow up right knee  ,doing home exercises  ,having a l ot of instability and swelling  [] : no [FreeTextEntry1] : Rt Knee [FreeTextEntry3] : 07/26/23 [FreeTextEntry5] : Pt here for Rt Knee Injury. Pt was playing tennis was going to do an over head throw when she felt two snaps on her Rt Knee. Pt not able to bare weight as shes walking. Rt Knee clicking and snapping. No swelling or tenderness. ROM limited. No N/T.  [FreeTextEntry9] : home exercises [de-identified] : home exercises

## 2023-08-10 ENCOUNTER — OFFICE (OUTPATIENT)
Dept: URBAN - METROPOLITAN AREA CLINIC 32 | Facility: CLINIC | Age: 53
Setting detail: OPHTHALMOLOGY
End: 2023-08-10
Payer: COMMERCIAL

## 2023-08-10 DIAGNOSIS — H52.13: ICD-10-CM

## 2023-08-10 DIAGNOSIS — H43.393: ICD-10-CM

## 2023-08-10 DIAGNOSIS — H43.823: ICD-10-CM

## 2023-08-10 PROCEDURE — 99213 OFFICE O/P EST LOW 20 MIN: CPT | Performed by: OPHTHALMOLOGY

## 2023-08-10 PROCEDURE — 92134 CPTRZ OPH DX IMG PST SGM RTA: CPT | Performed by: OPHTHALMOLOGY

## 2023-08-10 ASSESSMENT — SPHEQUIV_DERIVED
OD_SPHEQUIV: -6.125
OD_SPHEQUIV: -5.875
OS_SPHEQUIV: -6.5
OS_SPHEQUIV: -6.375

## 2023-08-10 ASSESSMENT — VISUAL ACUITY
OD_BCVA: 20/20-
OS_BCVA: 20/20-

## 2023-08-10 ASSESSMENT — SUPERFICIAL PUNCTATE KERATITIS (SPK)
OD_SPK: 1+
OS_SPK: 1+

## 2023-08-10 ASSESSMENT — REFRACTION_AUTOREFRACTION
OD_SPHERE: -5.75
OD_AXIS: 151
OD_AXIS: 067
OD_SPHERE: -6.25
OD_CYLINDER: +0.25
OS_SPHERE: -6.25
OD_CYLINDER: -0.25
OS_AXIS: 023
OS_AXIS: 104
OS_CYLINDER: +0.50
OS_SPHERE: -6.75
OS_CYLINDER: -0.25

## 2023-08-10 ASSESSMENT — CONFRONTATIONAL VISUAL FIELD TEST (CVF)
OD_FINDINGS: FULL
OS_FINDINGS: FULL

## 2023-08-10 ASSESSMENT — KERATOMETRY
OD_K2POWER_DIOPTERS: 44.00
OS_AXISANGLE_DEGREES: 096
METHOD_AUTO_MANUAL: AUTO
OS_K1POWER_DIOPTERS: 43.50
OD_AXISANGLE_DEGREES: 078
OD_K1POWER_DIOPTERS: 43.00
OS_K2POWER_DIOPTERS: 44.25

## 2023-08-10 ASSESSMENT — AXIALLENGTH_DERIVED
OD_AL: 26.2468
OD_AL: 26.1268
OS_AL: 26.2571
OS_AL: 26.1969

## 2023-08-10 ASSESSMENT — LID EXAM ASSESSMENTS
OD_MEIBOMITIS: RLL 2+
OS_MEIBOMITIS: LLL 2+
OD_BLEPHARITIS: RUL 1+
OS_BLEPHARITIS: LUL 1+

## 2023-09-06 ENCOUNTER — APPOINTMENT (OUTPATIENT)
Dept: ORTHOPEDIC SURGERY | Facility: CLINIC | Age: 53
End: 2023-09-06

## 2024-05-20 ENCOUNTER — OFFICE (OUTPATIENT)
Dept: URBAN - METROPOLITAN AREA CLINIC 35 | Facility: CLINIC | Age: 54
Setting detail: OPHTHALMOLOGY
End: 2024-05-20
Payer: COMMERCIAL

## 2024-05-20 ENCOUNTER — RX ONLY (RX ONLY)
Age: 54
End: 2024-05-20

## 2024-05-20 DIAGNOSIS — H16.223: ICD-10-CM

## 2024-05-20 DIAGNOSIS — H25.013: ICD-10-CM

## 2024-05-20 DIAGNOSIS — H43.393: ICD-10-CM

## 2024-05-20 DIAGNOSIS — H01.004: ICD-10-CM

## 2024-05-20 DIAGNOSIS — H01.001: ICD-10-CM

## 2024-05-20 DIAGNOSIS — H11.153: ICD-10-CM

## 2024-05-20 DIAGNOSIS — H16.423: ICD-10-CM

## 2024-05-20 PROCEDURE — 92014 COMPRE OPH EXAM EST PT 1/>: CPT | Performed by: OPHTHALMOLOGY

## 2024-05-20 ASSESSMENT — LID EXAM ASSESSMENTS
OD_BLEPHARITIS: RUL 1+
OS_BLEPHARITIS: LUL 1+
OD_MEIBOMITIS: RLL 2+
OS_MEIBOMITIS: LLL 2+

## 2024-05-20 ASSESSMENT — CONFRONTATIONAL VISUAL FIELD TEST (CVF)
OD_FINDINGS: FULL
OS_FINDINGS: FULL

## 2025-02-26 ENCOUNTER — APPOINTMENT (OUTPATIENT)
Dept: PULMONOLOGY | Facility: CLINIC | Age: 55
End: 2025-02-26
Payer: COMMERCIAL

## 2025-02-26 VITALS
HEIGHT: 62 IN | WEIGHT: 152 LBS | RESPIRATION RATE: 16 BRPM | DIASTOLIC BLOOD PRESSURE: 86 MMHG | OXYGEN SATURATION: 97 % | SYSTOLIC BLOOD PRESSURE: 122 MMHG | HEART RATE: 82 BPM | BODY MASS INDEX: 27.97 KG/M2 | TEMPERATURE: 98.2 F

## 2025-02-26 DIAGNOSIS — K21.9 GASTRO-ESOPHAGEAL REFLUX DISEASE W/OUT ESOPHAGITIS: ICD-10-CM

## 2025-02-26 DIAGNOSIS — R06.02 SHORTNESS OF BREATH: ICD-10-CM

## 2025-02-26 DIAGNOSIS — J30.9 ALLERGIC RHINITIS, UNSPECIFIED: ICD-10-CM

## 2025-02-26 DIAGNOSIS — K20.0 EOSINOPHILIC ESOPHAGITIS: ICD-10-CM

## 2025-02-26 DIAGNOSIS — J45.909 UNSPECIFIED ASTHMA, UNCOMPLICATED: ICD-10-CM

## 2025-02-26 PROCEDURE — 95012 NITRIC OXIDE EXP GAS DETER: CPT

## 2025-02-26 PROCEDURE — 99214 OFFICE O/P EST MOD 30 MIN: CPT | Mod: 25

## 2025-02-26 PROCEDURE — 94010 BREATHING CAPACITY TEST: CPT

## 2025-06-19 ENCOUNTER — DOCTOR'S OFFICE (OUTPATIENT)
Facility: LOCATION | Age: 55
Setting detail: OPHTHALMOLOGY
End: 2025-06-19
Payer: COMMERCIAL

## 2025-06-19 DIAGNOSIS — H16.9: ICD-10-CM

## 2025-06-19 DIAGNOSIS — H25.013: ICD-10-CM

## 2025-06-19 DIAGNOSIS — H11.153: ICD-10-CM

## 2025-06-19 DIAGNOSIS — H16.223: ICD-10-CM

## 2025-06-19 PROCEDURE — 92012 INTRM OPH EXAM EST PATIENT: CPT | Performed by: OPHTHALMOLOGY

## 2025-06-19 ASSESSMENT — REFRACTION_MANIFEST
OS_ADD: +2.25
OD_SPHERE: -5.75
OD_ADD: +2.25
OS_SPHERE: -6.00
OD_CYLINDER: -0.25
OD_AXIS: 105
OS_CYLINDER: SPH

## 2025-06-19 ASSESSMENT — KERATOMETRY
OS_K1POWER_DIOPTERS: 43.25
OD_K1POWER_DIOPTERS: 43.25
OS_K2POWER_DIOPTERS: 44.25
OD_AXISANGLE_DEGREES: 064
OS_AXISANGLE_DEGREES: 099
OD_K2POWER_DIOPTERS: 44.00
METHOD_AUTO_MANUAL: AUTO

## 2025-06-19 ASSESSMENT — REFRACTION_AUTOREFRACTION
OS_AXIS: 108
OS_CYLINDER: +0.50
OS_SPHERE: -6.75
OS_CYLINDER: +0.25
OD_SPHERE: -6.25
OD_SPHERE: -6.25
OD_AXIS: 073
OS_SPHERE: -6.50
OS_AXIS: 104
OD_CYLINDER: +0.25
OD_CYLINDER: +0.75
OD_AXIS: 067

## 2025-06-19 ASSESSMENT — LID EXAM ASSESSMENTS
OS_MEIBOMITIS: LLL 2+
OS_BLEPHARITIS: LUL 1+
OD_MEIBOMITIS: RLL 2+
OD_BLEPHARITIS: RUL 1+

## 2025-06-19 ASSESSMENT — REFRACTION_CURRENTRX
OD_OVR_VA: 20/
OS_ADD: +1.75
OS_OVR_VA: 20/
OD_ADD: +1.75
OS_VPRISM_DIRECTION: PROGS
OS_AXIS: 028
OS_SPHERE: -6.00
OD_CYLINDER: +0.25
OD_AXIS: 011
OD_SPHERE: -5.75
OS_CYLINDER: +0.25
OD_VPRISM_DIRECTION: PROGS

## 2025-06-19 ASSESSMENT — DRY EYES - PHYSICIAN NOTES: OD_GENERALCOMMENTS: KERATITIS

## 2025-06-19 ASSESSMENT — VISUAL ACUITY
OD_BCVA: 20/20-1
OS_BCVA: 20/20

## 2025-06-19 ASSESSMENT — TONOMETRY
OS_IOP_MMHG: 15
OD_IOP_MMHG: 17